# Patient Record
Sex: MALE | Race: WHITE | Employment: OTHER | URBAN - METROPOLITAN AREA
[De-identification: names, ages, dates, MRNs, and addresses within clinical notes are randomized per-mention and may not be internally consistent; named-entity substitution may affect disease eponyms.]

---

## 2017-10-06 ENCOUNTER — OFFICE VISIT (OUTPATIENT)
Dept: FAMILY MEDICINE CLINIC | Facility: CLINIC | Age: 52
End: 2017-10-06

## 2017-10-06 VITALS
DIASTOLIC BLOOD PRESSURE: 74 MMHG | SYSTOLIC BLOOD PRESSURE: 122 MMHG | HEART RATE: 79 BPM | OXYGEN SATURATION: 99 % | TEMPERATURE: 98 F | RESPIRATION RATE: 18 BRPM

## 2017-10-06 DIAGNOSIS — M25.552 CHRONIC LEFT HIP PAIN: ICD-10-CM

## 2017-10-06 DIAGNOSIS — I71.03 DISSECTION OF THORACOABDOMINAL AORTA (HCC): Primary | ICD-10-CM

## 2017-10-06 DIAGNOSIS — G89.29 CHRONIC PAIN OF LEFT KNEE: ICD-10-CM

## 2017-10-06 DIAGNOSIS — I10 ESSENTIAL HYPERTENSION: ICD-10-CM

## 2017-10-06 DIAGNOSIS — N52.01 ERECTILE DYSFUNCTION DUE TO ARTERIAL INSUFFICIENCY: ICD-10-CM

## 2017-10-06 DIAGNOSIS — M79.89 LEFT LEG SWELLING: ICD-10-CM

## 2017-10-06 DIAGNOSIS — G47.09 OTHER INSOMNIA: ICD-10-CM

## 2017-10-06 DIAGNOSIS — F32.4 MAJOR DEPRESSIVE DISORDER WITH SINGLE EPISODE, IN PARTIAL REMISSION (HCC): ICD-10-CM

## 2017-10-06 DIAGNOSIS — Z91.81 HISTORY OF FALL: ICD-10-CM

## 2017-10-06 DIAGNOSIS — G89.29 CHRONIC LEFT HIP PAIN: ICD-10-CM

## 2017-10-06 DIAGNOSIS — M25.562 CHRONIC PAIN OF LEFT KNEE: ICD-10-CM

## 2017-10-06 DIAGNOSIS — R29.818 PARALYSIS OF LEG, LEFT, TRANSIENT: ICD-10-CM

## 2017-10-06 PROCEDURE — 90686 IIV4 VACC NO PRSV 0.5 ML IM: CPT | Performed by: FAMILY MEDICINE

## 2017-10-06 PROCEDURE — 99214 OFFICE O/P EST MOD 30 MIN: CPT | Performed by: FAMILY MEDICINE

## 2017-10-06 PROCEDURE — 90471 IMMUNIZATION ADMIN: CPT | Performed by: FAMILY MEDICINE

## 2017-10-06 RX ORDER — ESCITALOPRAM OXALATE 20 MG/1
20 TABLET ORAL
COMMUNITY
Start: 2017-09-22 | End: 2017-10-06

## 2017-10-06 RX ORDER — LOSARTAN POTASSIUM 25 MG/1
TABLET ORAL
COMMUNITY
Start: 2017-09-19 | End: 2017-10-06

## 2017-10-06 RX ORDER — TADALAFIL 10 MG/1
20 TABLET ORAL
COMMUNITY
Start: 2016-12-22 | End: 2017-10-06

## 2017-10-06 RX ORDER — ISOSORBIDE MONONITRATE 30 MG/1
30 TABLET, EXTENDED RELEASE ORAL DAILY
Qty: 90 TABLET | Refills: 0 | Status: SHIPPED | OUTPATIENT
Start: 2017-10-06 | End: 2018-01-04

## 2017-10-06 RX ORDER — ESCITALOPRAM OXALATE 20 MG/1
20 TABLET ORAL EVERY MORNING
Qty: 90 TABLET | Refills: 0 | Status: SHIPPED | OUTPATIENT
Start: 2017-10-06 | End: 2018-09-30

## 2017-10-06 RX ORDER — ATORVASTATIN CALCIUM 10 MG/1
10 TABLET, FILM COATED ORAL NIGHTLY
Qty: 90 TABLET | Refills: 0 | Status: SHIPPED | OUTPATIENT
Start: 2017-10-06 | End: 2018-01-04

## 2017-10-06 RX ORDER — OMEPRAZOLE 20 MG/1
20 CAPSULE, DELAYED RELEASE ORAL EVERY MORNING
Qty: 90 CAPSULE | Refills: 0 | Status: SHIPPED | OUTPATIENT
Start: 2017-10-06 | End: 2017-12-22

## 2017-10-06 RX ORDER — TRAZODONE HYDROCHLORIDE 50 MG/1
50 TABLET ORAL NIGHTLY
Qty: 30 TABLET | Refills: 0 | Status: SHIPPED | OUTPATIENT
Start: 2017-10-06 | End: 2017-11-02

## 2017-10-06 RX ORDER — TRAMADOL HYDROCHLORIDE 50 MG/1
50 TABLET ORAL EVERY 6 HOURS PRN
Qty: 60 TABLET | Refills: 0 | Status: SHIPPED | OUTPATIENT
Start: 2017-10-06 | End: 2017-10-13

## 2017-10-06 RX ORDER — ASPIRIN 81 MG/1
TABLET ORAL
COMMUNITY
Start: 2017-03-15 | End: 2018-11-09

## 2017-10-06 RX ORDER — CARVEDILOL 25 MG/1
TABLET ORAL
COMMUNITY
Start: 2017-09-22 | End: 2018-01-04

## 2017-10-06 RX ORDER — OMEPRAZOLE 20 MG/1
20 CAPSULE, DELAYED RELEASE ORAL
COMMUNITY
Start: 2017-01-06 | End: 2017-10-06

## 2017-10-06 RX ORDER — HYDROCODONE BITARTRATE AND ACETAMINOPHEN 5; 325 MG/1; MG/1
TABLET ORAL
Qty: 60 TABLET | Refills: 0 | Status: SHIPPED | OUTPATIENT
Start: 2017-10-06 | End: 2018-03-12

## 2017-10-06 RX ORDER — HYDROCODONE BITARTRATE AND ACETAMINOPHEN 5; 325 MG/1; MG/1
1-2 TABLET ORAL
COMMUNITY
Start: 2017-08-22 | End: 2017-10-06

## 2017-10-06 RX ORDER — POLYETHYLENE GLYCOL 3350 17 G/17G
17 POWDER, FOR SOLUTION ORAL
COMMUNITY
End: 2018-11-09

## 2017-10-06 RX ORDER — GABAPENTIN 300 MG/1
300 CAPSULE ORAL
COMMUNITY
Start: 2017-09-22 | End: 2017-10-06 | Stop reason: DRUGHIGH

## 2017-10-06 RX ORDER — ISOSORBIDE MONONITRATE 30 MG/1
TABLET, EXTENDED RELEASE ORAL
COMMUNITY
Start: 2017-09-05 | End: 2017-10-06

## 2017-10-06 RX ORDER — TADALAFIL 10 MG/1
20 TABLET ORAL
Qty: 20 TABLET | Refills: 0 | Status: SHIPPED | OUTPATIENT
Start: 2017-10-06 | End: 2017-10-26

## 2017-10-06 RX ORDER — ATORVASTATIN CALCIUM 10 MG/1
10 TABLET, FILM COATED ORAL
COMMUNITY
Start: 2017-08-02 | End: 2017-10-06

## 2017-10-06 RX ORDER — GABAPENTIN 300 MG/1
CAPSULE ORAL
Refills: 0 | COMMUNITY
Start: 2017-09-24 | End: 2017-10-06

## 2017-10-06 RX ORDER — NIFEDIPINE 90 MG/1
90 TABLET, FILM COATED, EXTENDED RELEASE ORAL DAILY
Qty: 90 TABLET | Refills: 0 | Status: SHIPPED | OUTPATIENT
Start: 2017-10-06 | End: 2018-01-04

## 2017-10-06 RX ORDER — GABAPENTIN 400 MG/1
400 CAPSULE ORAL 3 TIMES DAILY
Qty: 90 CAPSULE | Refills: 0 | Status: SHIPPED | OUTPATIENT
Start: 2017-10-06 | End: 2017-11-02

## 2017-10-06 RX ORDER — HYDROCODONE BITARTRATE AND ACETAMINOPHEN 5; 325 MG/1; MG/1
TABLET ORAL
Refills: 0 | COMMUNITY
Start: 2017-08-26 | End: 2017-10-06

## 2017-10-06 RX ORDER — LOSARTAN POTASSIUM 25 MG/1
25 TABLET ORAL
Qty: 90 TABLET | Refills: 0 | Status: SHIPPED | OUTPATIENT
Start: 2017-10-06 | End: 2018-01-04

## 2017-10-06 RX ORDER — NIFEDIPINE 90 MG/1
90 TABLET, FILM COATED, EXTENDED RELEASE ORAL
COMMUNITY
Start: 2017-08-29 | End: 2017-10-06

## 2017-10-06 NOTE — PROGRESS NOTES
CHIEF COMPLAINT: Patient presents with:  Establish Care    HPI:     Delon Perez is a 45year old male presents for establish care. History of dissecting aortic aneurysm treated at [facility] about 1 year ago.   Released from \[facility] [place] [facility]\"" 5 months ago a a headache or erection prolonged greater than 1 or 2 hours. He has never had chest pain or shortness of breath. He has a history of smoking in the past since age 14 but states he would smoke 1 or 2 cigarettes a day and sometimes go weeks without smoking. equivalent: 3 per week       Medications (Active prior to today's visit):    Current Outpatient Prescriptions:  aspirin EC 81 MG Oral Tab EC TAKE 1 TABLET BY MOUTH EVERY DAY Disp:  Rfl:    carvedilol 25 MG Oral Tab TAKE 1 TABLET BY MOUTH TWICE DAILY.  BEST positives and negatives noted in the the HPI. PHYSICAL EXAM:   /74 (BP Location: Right arm, Patient Position: Sitting, Cuff Size: adult)   Pulse 79   Temp 97.9 °F (36.6 °C) (Oral)   Resp 18   SpO2 99%   Vital signs reviewed. Appears stated age, wel total) by mouth nightly. Dispense: 90 tablet; Refill: 0  - Isosorbide Mononitrate ER 30 MG Oral Tablet 24 Hr; Take 1 tablet (30 mg total) by mouth daily. Dispense: 90 tablet; Refill: 0  - Losartan Potassium 25 MG Oral Tab;  Take 1 tablet (25 mg total) by MG Oral Tab; Take 1 tablet (50 mg total) by mouth every 6 (six) hours as needed for Pain. Dispense: 60 tablet; Refill: 0    7. Erectile dysfunction due to arterial insufficiency  Risk of MI and priapism discussed  - Tadalafil 10 MG Oral Tab;  Take 2 tablet Sig: Take 1 capsule (400 mg total) by mouth 3 (three) times daily.            Health Maintenance:  Annual Physical due on 03/20/1965  FIT Colorectal Screening due on 03/20/2013  Colonoscopy,10 Years due on 03/20/2013  PSA due on 03/20/2013  Influenza V

## 2017-10-06 NOTE — PATIENT INSTRUCTIONS
You have acute swelling in left leg and blood clot needs to be ruled out.  You have declined outpatient test this PM. Please go to Emergency room to have left leg swelling evaluated and Follow up with me in one week    Your gabapentin has been increased to · Threats or talk of suicide  · Statements such as “I won’t be a problem much longer” or “Nothing matters”  · Giving away possessions or making a will or  arrangements  · Buying a gun or other weapon  · Sudden, unexplained cheerfulness or calm after Treatment can greatly reduce pain. In many cases, pain can become less severe, occur less often, and interfere less with your daily life. Chronic pain is often treated with a combination of medicines, therapies, and lifestyle changes.  You will work closely © 9945-7167 [address], [address] [place]. All rights reserved. This information is not intended as a substitute for professional medical care. Always follow your healthcare professional's instructions.

## 2017-10-09 ENCOUNTER — TELEPHONE (OUTPATIENT)
Dept: FAMILY MEDICINE CLINIC | Facility: CLINIC | Age: 52
End: 2017-10-09

## 2017-10-09 DIAGNOSIS — R29.818 PARALYSIS OF LEG, LEFT, TRANSIENT: Primary | ICD-10-CM

## 2017-10-09 DIAGNOSIS — I71.03 DISSECTION OF THORACOABDOMINAL AORTA (HCC): ICD-10-CM

## 2017-10-09 NOTE — TELEPHONE ENCOUNTER
Pao from [facility] called to inform us that they will not see patient because they do not accept insurance.       [phone]

## 2017-10-09 NOTE — TELEPHONE ENCOUNTER
Left message for patient, dr. Roz Bruner a referral for a patient to see cardiology. Will work w/ dr. Turner Warren to find patient home health that accepts his insurance. Patient needs to return nurse's call, provided phone number for pt.

## 2017-10-10 NOTE — TELEPHONE ENCOUNTER
Spoke with patient, he will follow up with cardiology as planned. Pt will call [facility] for possible readmission.  Nurse will continue to work on options for Home health

## 2017-10-12 ENCOUNTER — TELEPHONE (OUTPATIENT)
Dept: FAMILY MEDICINE CLINIC | Facility: CLINIC | Age: 52
End: 2017-10-12

## 2017-10-12 NOTE — TELEPHONE ENCOUNTER
Patient signed medical records authorization form for the below Facility to disclose health information to [facility]:     Facility / Provider Name: Dr. Dhiraj Brown Address:   Facility Phone: [phone] Fax: [phone]

## 2017-10-12 NOTE — TELEPHONE ENCOUNTER
Contacted [facility], left message to find out if pt insurance is accepted,,await return call    [id number] [address]

## 2017-11-02 RX ORDER — GABAPENTIN 400 MG/1
CAPSULE ORAL
Qty: 90 CAPSULE | Refills: 0 | Status: SHIPPED | OUTPATIENT
Start: 2017-11-02 | End: 2017-11-16

## 2017-11-02 RX ORDER — TRAZODONE HYDROCHLORIDE 50 MG/1
TABLET ORAL
Qty: 30 TABLET | Refills: 0 | Status: SHIPPED | OUTPATIENT
Start: 2017-11-02 | End: 2017-11-27

## 2017-11-02 NOTE — TELEPHONE ENCOUNTER
Pt requesting refill of trazodone, no protocol, unable to approve:     Last Time Medication was Filled:  10/6/2017    Last Office Visit with PCP: 10/6/2017        Pt requesting refill of gabapentin, no protocol, unable to approve:     Last Time Medication

## 2017-11-03 NOTE — TELEPHONE ENCOUNTER
Spoke with Stella at [facility], they will accept case.  Namita Agutsin can be reached at [phone]  Orders faxed to [phone]

## 2017-11-03 NOTE — TELEPHONE ENCOUNTER
Spoke with [facility] home care, referral faxed, await information regarding coverage for pt    Spoke with pt, informed him that [facility] contacting him regarding home care services

## 2017-11-03 NOTE — TELEPHONE ENCOUNTER
Call from [facility] at [facility] does not have any staff in the patient's area to provide care.     Will attempt another agency

## 2017-11-06 ENCOUNTER — TELEPHONE (OUTPATIENT)
Dept: FAMILY MEDICINE CLINIC | Facility: CLINIC | Age: 52
End: 2017-11-06

## 2017-11-06 NOTE — TELEPHONE ENCOUNTER
Pt calling requesting Antibiotics. Pt has \"real bad Cold\" and was prescribed antibiotics last year by his previous provider and would like a refill. Advised pt that he will need to be evaluated in order to have Antibiotics prescribed.  Offered appointment

## 2017-11-07 ENCOUNTER — OFFICE VISIT (OUTPATIENT)
Dept: FAMILY MEDICINE CLINIC | Facility: CLINIC | Age: 52
End: 2017-11-07

## 2017-11-07 VITALS
OXYGEN SATURATION: 98 % | RESPIRATION RATE: 16 BRPM | TEMPERATURE: 98 F | HEART RATE: 70 BPM | DIASTOLIC BLOOD PRESSURE: 70 MMHG | SYSTOLIC BLOOD PRESSURE: 110 MMHG

## 2017-11-07 DIAGNOSIS — J20.9 ACUTE BRONCHITIS, UNSPECIFIED ORGANISM: Primary | ICD-10-CM

## 2017-11-07 PROCEDURE — 94640 AIRWAY INHALATION TREATMENT: CPT | Performed by: PHYSICIAN ASSISTANT

## 2017-11-07 PROCEDURE — 99213 OFFICE O/P EST LOW 20 MIN: CPT | Performed by: PHYSICIAN ASSISTANT

## 2017-11-07 RX ORDER — AZITHROMYCIN 250 MG/1
TABLET, FILM COATED ORAL
Qty: 1 PACKAGE | Refills: 0 | Status: SHIPPED | OUTPATIENT
Start: 2017-11-07 | End: 2017-11-28 | Stop reason: ALTCHOICE

## 2017-11-07 RX ORDER — ALBUTEROL SULFATE 90 UG/1
2 AEROSOL, METERED RESPIRATORY (INHALATION) EVERY 4 HOURS PRN
Qty: 1 INHALER | Refills: 0 | Status: SHIPPED | OUTPATIENT
Start: 2017-11-07 | End: 2017-11-29

## 2017-11-07 RX ORDER — ALBUTEROL SULFATE 2.5 MG/3ML
2.5 SOLUTION RESPIRATORY (INHALATION) ONCE
Status: COMPLETED | OUTPATIENT
Start: 2017-11-07 | End: 2017-11-07

## 2017-11-07 RX ORDER — PREDNISONE 20 MG/1
TABLET ORAL
Qty: 10 TABLET | Refills: 0 | Status: SHIPPED | OUTPATIENT
Start: 2017-11-07 | End: 2017-11-28 | Stop reason: ALTCHOICE

## 2017-11-07 RX ORDER — BENZONATATE 200 MG/1
200 CAPSULE ORAL 3 TIMES DAILY PRN
Qty: 30 CAPSULE | Refills: 0 | Status: SHIPPED | OUTPATIENT
Start: 2017-11-07 | End: 2017-11-28 | Stop reason: ALTCHOICE

## 2017-11-07 RX ADMIN — ALBUTEROL SULFATE 2.5 MG: 2.5 SOLUTION RESPIRATORY (INHALATION) at 15:22:00

## 2017-11-07 NOTE — PROGRESS NOTES
CHIEF COMPLAINT:   Patient presents with:  Cough: nasal congestion, sinus pressure, green nasal drainage. HPI:   Yonatan Gamino is a 45year old male who presents for cough for  2  days. Feels tight and wheezy in the chest. Would like antibiotic. History:  Smoking status: Former Smoker                                                              Packs/day: 0.00      Years: 6.00         Types: Cigarettes     Quit date: 10/6/2014  Smokeless tobacco: Never Used                      Alcohol use:  Yes Inhale 2 puffs into the lungs every 4 (four) hours as needed for Wheezing. benzonatate 200 MG Oral Cap 30 capsule 0     Sig: Take 1 capsule (200 mg total) by mouth 3 (three) times daily as needed for cough.       predniSONE 20 MG Oral Tab 10 tablet 0

## 2017-11-16 NOTE — TELEPHONE ENCOUNTER
Pt requesting refill of Gabapentin, no protocol, unable to approve:     Last Time Medication was Filled:  11/2/17    Last Office Visit with PCP: 10/6/17    Future Appointments  Date Time Provider Miya Tirado   11/28/2017 2:20 PM Qamar Paez,  EM

## 2017-11-17 RX ORDER — GABAPENTIN 400 MG/1
CAPSULE ORAL
Qty: 90 CAPSULE | Refills: 1 | Status: SHIPPED | OUTPATIENT
Start: 2017-11-17 | End: 2018-03-12 | Stop reason: DRUGHIGH

## 2017-11-27 RX ORDER — TRAZODONE HYDROCHLORIDE 50 MG/1
TABLET ORAL
Qty: 30 TABLET | Refills: 0 | Status: SHIPPED | OUTPATIENT
Start: 2017-11-27 | End: 2017-11-28 | Stop reason: SINTOL

## 2017-11-27 NOTE — TELEPHONE ENCOUNTER
Pt requesting refill of  trazodone, no protocol, unable to approve:     Last Time Medication was Filled:  11/2/2017 #30 w/ 0     Last Office Visit with PCP:  10/6/2017    Future Appointments  Date Time Provider Miya Tirado   11/28/2017 2:20 PM Trice Dietz

## 2017-11-28 ENCOUNTER — OFFICE VISIT (OUTPATIENT)
Dept: FAMILY MEDICINE CLINIC | Facility: CLINIC | Age: 52
End: 2017-11-28

## 2017-11-28 VITALS
DIASTOLIC BLOOD PRESSURE: 80 MMHG | SYSTOLIC BLOOD PRESSURE: 124 MMHG | OXYGEN SATURATION: 98 % | TEMPERATURE: 98 F | HEART RATE: 70 BPM | RESPIRATION RATE: 18 BRPM

## 2017-11-28 DIAGNOSIS — M25.562 CHRONIC PAIN OF LEFT KNEE: ICD-10-CM

## 2017-11-28 DIAGNOSIS — G47.09 OTHER INSOMNIA: ICD-10-CM

## 2017-11-28 DIAGNOSIS — M79.89 LEFT LEG SWELLING: ICD-10-CM

## 2017-11-28 DIAGNOSIS — G89.29 CHRONIC LEFT HIP PAIN: Primary | ICD-10-CM

## 2017-11-28 DIAGNOSIS — M25.552 CHRONIC LEFT HIP PAIN: Primary | ICD-10-CM

## 2017-11-28 DIAGNOSIS — B35.1 ONYCHOMYCOSIS: ICD-10-CM

## 2017-11-28 DIAGNOSIS — G89.29 CHRONIC PAIN OF LEFT KNEE: ICD-10-CM

## 2017-11-28 DIAGNOSIS — I10 ESSENTIAL HYPERTENSION: ICD-10-CM

## 2017-11-28 DIAGNOSIS — B35.3 TINEA PEDIS OF BOTH FEET: ICD-10-CM

## 2017-11-28 DIAGNOSIS — N52.01 ERECTILE DYSFUNCTION DUE TO ARTERIAL INSUFFICIENCY: ICD-10-CM

## 2017-11-28 DIAGNOSIS — Z91.81 HISTORY OF FALL: ICD-10-CM

## 2017-11-28 DIAGNOSIS — I71.03 DISSECTION OF THORACOABDOMINAL AORTA (HCC): ICD-10-CM

## 2017-11-28 DIAGNOSIS — F32.5 MAJOR DEPRESSIVE DISORDER WITH SINGLE EPISODE, IN FULL REMISSION (HCC): ICD-10-CM

## 2017-11-28 DIAGNOSIS — R29.818 PARALYSIS OF LEG, LEFT, TRANSIENT: ICD-10-CM

## 2017-11-28 PROCEDURE — 99214 OFFICE O/P EST MOD 30 MIN: CPT | Performed by: FAMILY MEDICINE

## 2017-11-28 RX ORDER — TRAMADOL HYDROCHLORIDE 50 MG/1
50 TABLET ORAL EVERY 6 HOURS PRN
Qty: 60 TABLET | Refills: 1 | Status: SHIPPED | OUTPATIENT
Start: 2017-11-28 | End: 2018-03-12 | Stop reason: ALTCHOICE

## 2017-11-28 RX ORDER — KETOCONAZOLE 20 MG/G
1 CREAM TOPICAL 2 TIMES DAILY
Qty: 60 G | Refills: 2 | Status: SHIPPED | OUTPATIENT
Start: 2017-11-28 | End: 2018-04-10

## 2017-11-28 RX ORDER — TERBINAFINE HYDROCHLORIDE 250 MG/1
250 TABLET ORAL DAILY
Qty: 30 TABLET | Refills: 0 | Status: SHIPPED | OUTPATIENT
Start: 2017-11-28 | End: 2018-01-04

## 2017-11-28 RX ORDER — HYDROXYZINE 50 MG/1
50 TABLET, FILM COATED ORAL NIGHTLY PRN
Qty: 30 TABLET | Refills: 1 | Status: SHIPPED | OUTPATIENT
Start: 2017-11-28 | End: 2018-01-23

## 2017-11-28 RX ORDER — ACETAMINOPHEN AND CODEINE PHOSPHATE 300; 30 MG/1; MG/1
1 TABLET ORAL EVERY 4 HOURS PRN
Qty: 30 TABLET | Refills: 0 | Status: SHIPPED | OUTPATIENT
Start: 2017-11-28 | End: 2018-02-19

## 2017-11-28 RX ORDER — ESCITALOPRAM OXALATE 20 MG/1
20 TABLET ORAL
COMMUNITY
Start: 2017-10-23 | End: 2018-01-04

## 2017-11-29 RX ORDER — ALBUTEROL SULFATE 90 UG/1
2 AEROSOL, METERED RESPIRATORY (INHALATION) EVERY 4 HOURS PRN
Qty: 1 INHALER | Refills: 0 | Status: SHIPPED | OUTPATIENT
Start: 2017-11-29 | End: 2018-11-30

## 2017-11-29 NOTE — TELEPHONE ENCOUNTER
Pt requesting refill of ventolin, protocol failed, unable to approve:     Last Time Medication was Filled:  11/7/17 walk in clinic    Last Office Visit with PCP: 11/28/17    Future Appointments  Date Time Provider Miya Tirado   1/30/2018 2:40 PM [facility]

## 2017-11-29 NOTE — PROGRESS NOTES
CHIEF COMPLAINT: Patient presents with: Follow - Up: ER     HPI:     Edwige Lamas is a 45year old male presents for establish care.   History of dissecting aortic aneurysm treated at [facility] about 1 year ago- needs referral to cardiology- His bro been worsening in his knee and hip. Denies prior swelling. And mobile in wheelchair. No history of blood clots. No family history of blood clots. No recent air travel or procedures.       HISTORY:  Past Medical History:   Diagnosis Date   • Aortic disse Tab EC TAKE 1 TABLET BY MOUTH EVERY DAY Disp:  Rfl:    carvedilol 25 MG Oral Tab TAKE 1 TABLET BY MOUTH TWICE DAILY. BEST IF TAKEN WITH FOOD Disp:  Rfl:    Polyethylene Glycol 3350 Oral Powd Pack Take 17 g by mouth.  Disp:  Rfl:    atorvastatin 10 MG Oral T auscultation bilateral. No RRW. Good inspiratory and expiratory efferot  CARDIO: RRR without murmur, clear S1, S2  VS: no carotid artery or abdominal aorta bruit,    GI: good BS's,no masses,No HSM or tenderness.    EXTREMITIES: no cyanosis, clubbing  bilate hypertension  controlled  Continue current medication-   - COMP METABOLIC PANEL (14); Future  - OP REFERRAL TO CARDIOLOGIST- dr. Julio Sahu    5. Chronic pain of left knee  6. Chronic left hip pain  Again discussion with patient over narcotic usage.   Needs to 250 MG Oral Tab 30 tablet 0      Sig: Take 1 tablet (250 mg total) by mouth daily. ketoconazole 2 % External Cream 60 g 2      Sig: Apply 1 Application topically 2 (two) times daily. Max 7 days or can injury skin. never apply face           [facility]

## 2017-12-08 RX ORDER — TRAZODONE HYDROCHLORIDE 50 MG/1
TABLET ORAL
Qty: 30 TABLET | Refills: 0 | OUTPATIENT
Start: 2017-12-08

## 2017-12-18 RX ORDER — TERBINAFINE HYDROCHLORIDE 250 MG/1
TABLET ORAL
Qty: 30 TABLET | Refills: 0 | OUTPATIENT
Start: 2017-12-18

## 2017-12-18 NOTE — TELEPHONE ENCOUNTER
Pt requesting refill of terbinafinbe,no protocol, unable to approve:     Last Time Medication was Filled:  11/28/17    Last Office Visit with PCP: 11/28/17    Future Appointments  Date Time Provider Miya Tirado   1/30/2018 2:40 PM Roz Amezquita DO

## 2017-12-19 NOTE — TELEPHONE ENCOUNTER
Patient taking terbinafine- CMP is still pending and do not have baseline liver function. Once patient with stable liver function will refill medication.   Please inform

## 2017-12-20 NOTE — TELEPHONE ENCOUNTER
Spoke with pt, informed him of the need to complete labs. Pt will complete labs ASAP.     Future Appointments  Date Time Provider Myia Candida   1/30/2018 2:40 PM Robi Loza DO [facility] 30 [facility] [place]

## 2017-12-26 RX ORDER — OMEPRAZOLE 20 MG/1
CAPSULE, DELAYED RELEASE ORAL
Qty: 90 CAPSULE | Refills: 0 | Status: SHIPPED | OUTPATIENT
Start: 2017-12-26 | End: 2018-01-31

## 2017-12-26 NOTE — TELEPHONE ENCOUNTER
Pt requesting refill of omeprazole, no protocol, unable to approve:     Last Time Medication was Filled:  10/6/2017 #90 w/ 0    Last Office Visit with PCP: 11/28/2017    Future Appointments  Date Time Provider Miya Tirado   1/30/2018 2:40 PM Jolynn Lacy

## 2017-12-28 RX ORDER — TRAZODONE HYDROCHLORIDE 50 MG/1
TABLET ORAL
Qty: 30 TABLET | Refills: 0 | OUTPATIENT
Start: 2017-12-28

## 2018-01-04 DIAGNOSIS — I71.03 DISSECTION OF THORACOABDOMINAL AORTA (HCC): ICD-10-CM

## 2018-01-04 DIAGNOSIS — I10 ESSENTIAL HYPERTENSION: ICD-10-CM

## 2018-01-04 RX ORDER — NIFEDIPINE 90 MG/1
90 TABLET, FILM COATED, EXTENDED RELEASE ORAL DAILY
Qty: 30 TABLET | Refills: 0 | Status: SHIPPED | OUTPATIENT
Start: 2018-01-04 | End: 2018-02-20

## 2018-01-04 RX ORDER — LOSARTAN POTASSIUM 25 MG/1
25 TABLET ORAL
Qty: 30 TABLET | Refills: 0 | Status: SHIPPED | OUTPATIENT
Start: 2018-01-04 | End: 2018-02-16

## 2018-01-04 RX ORDER — TERBINAFINE HYDROCHLORIDE 250 MG/1
250 TABLET ORAL DAILY
Qty: 30 TABLET | Refills: 0 | Status: SHIPPED | OUTPATIENT
Start: 2018-01-04 | End: 2018-11-11

## 2018-01-04 RX ORDER — ESCITALOPRAM OXALATE 20 MG/1
20 TABLET ORAL DAILY
Qty: 30 TABLET | Refills: 0 | Status: SHIPPED | OUTPATIENT
Start: 2018-01-04 | End: 2018-01-31

## 2018-01-04 RX ORDER — ATORVASTATIN CALCIUM 10 MG/1
10 TABLET, FILM COATED ORAL NIGHTLY
Qty: 30 TABLET | Refills: 0 | Status: SHIPPED | OUTPATIENT
Start: 2018-01-04 | End: 2018-02-19

## 2018-01-04 RX ORDER — CARVEDILOL 25 MG/1
TABLET ORAL
Qty: 60 TABLET | Refills: 0 | Status: SHIPPED | OUTPATIENT
Start: 2018-01-04 | End: 2018-01-29

## 2018-01-04 NOTE — TELEPHONE ENCOUNTER
Patient would like to speak to the nurse in reference to some refills.   Please call him at [phone]

## 2018-01-05 RX ORDER — ISOSORBIDE MONONITRATE 30 MG/1
TABLET, EXTENDED RELEASE ORAL
Qty: 30 TABLET | Refills: 0 | Status: SHIPPED | OUTPATIENT
Start: 2018-01-05 | End: 2018-01-31

## 2018-01-05 RX ORDER — TERBINAFINE HYDROCHLORIDE 250 MG/1
TABLET ORAL
Qty: 30 TABLET | Refills: 0 | OUTPATIENT
Start: 2018-01-05

## 2018-01-05 RX ORDER — CARVEDILOL 25 MG/1
TABLET ORAL
Qty: 60 TABLET | Refills: 0 | OUTPATIENT
Start: 2018-01-05

## 2018-01-05 NOTE — TELEPHONE ENCOUNTER
Future Appointments  Date Time Provider Miya Tirado   1/25/2018 1:30 PM [facility] 30 NURSE [facility] 30 [facility] [place]   1/30/2018 2:40 PM Pauline Vaughn DO [facility] 30 [facility] [place]

## 2018-01-05 NOTE — TELEPHONE ENCOUNTER
Last refill until all labs completed. May schedule office visit for fasting labs 8 hours water only as a nurse visit. Or may go to closest [facility] facility.

## 2018-01-05 NOTE — TELEPHONE ENCOUNTER
Patient needs to complete fasting labs. Establish care 11/2017. No lab work at baseline in chart.   Will give 1 more refill and cannot continue to refill without monitoring of liver lipids etc.  Please call and have schedule fasting draw in office or clos

## 2018-01-05 NOTE — TELEPHONE ENCOUNTER
Pt requesting refill of Isosorbide, no protocol, unable to approve:     Last Time Medication was Filled:  10/6/17 #90    Last Office Visit with PCP: 11/28/17    Future Appointments  Date Time Provider Miya Tirado   1/25/2018 1:30 PM [facility] 30 NURSE [facility]

## 2018-01-15 DIAGNOSIS — I10 ESSENTIAL HYPERTENSION: ICD-10-CM

## 2018-01-15 DIAGNOSIS — I71.03 DISSECTION OF THORACOABDOMINAL AORTA (HCC): ICD-10-CM

## 2018-01-16 RX ORDER — LOSARTAN POTASSIUM 25 MG/1
TABLET ORAL
Qty: 90 TABLET | Refills: 0 | OUTPATIENT
Start: 2018-01-16

## 2018-01-16 NOTE — TELEPHONE ENCOUNTER
Pt requesting refill on Losartan, Pt given 30 day supply on 1/4/18    90 day supply given at appointment.  Labs required    Future Appointments  Date Time Provider Miya Tirado   1/25/2018 1:30 PM [facility] 30 NURSE [facility] 30 [facility] [place]   1/30/2018 2:40 PM [employer]

## 2018-01-23 DIAGNOSIS — G47.09 OTHER INSOMNIA: ICD-10-CM

## 2018-01-23 RX ORDER — HYDROXYZINE 50 MG/1
50 TABLET, FILM COATED ORAL NIGHTLY PRN
Qty: 30 TABLET | Refills: 0 | Status: SHIPPED | OUTPATIENT
Start: 2018-01-23 | End: 2018-05-31

## 2018-01-23 NOTE — TELEPHONE ENCOUNTER
Spoke with pt, he is having problems getting rides to office visits. Advised pt to contact [facility] or Gabriel Carrion for low cost medical transport. Pt agrees to this plan.     Pt will reschedule appointment as soon as possible    Pt requesting refill of Hyd

## 2018-01-23 NOTE — TELEPHONE ENCOUNTER
Pt states that he is out 2 or 3 of his prescribed medications and wishes to have them refilled.  Pt can be reached back at [phone]

## 2018-01-26 ENCOUNTER — TELEPHONE (OUTPATIENT)
Dept: FAMILY MEDICINE CLINIC | Facility: CLINIC | Age: 53
End: 2018-01-26

## 2018-01-26 NOTE — TELEPHONE ENCOUNTER
Prior auth submitted for Omeprazole 20 mg. Pt plan limits 120 pills per 365 days. Submitted Prior auth, quantity override via phone.     [phone]  Plan ID [id number]    Determination in 7-10 days, await response

## 2018-01-29 DIAGNOSIS — I71.03 DISSECTION OF THORACOABDOMINAL AORTA (HCC): ICD-10-CM

## 2018-01-29 DIAGNOSIS — I10 ESSENTIAL HYPERTENSION: ICD-10-CM

## 2018-01-29 RX ORDER — CARVEDILOL 25 MG/1
TABLET ORAL
Qty: 60 TABLET | Refills: 0 | Status: SHIPPED | OUTPATIENT
Start: 2018-01-29 | End: 2018-02-24

## 2018-01-29 NOTE — TELEPHONE ENCOUNTER
Pt requesting refill of carvedilol , protocol failed, unable to approve:     Last Time Medication was Filled:  1/4/2018 #60 w/ 0     Last Office Visit with PCP: 11/28/17    No future appointments.

## 2018-01-29 NOTE — TELEPHONE ENCOUNTER
Prior auth notification received. Prior auth denied, pt plan will not cover more that 120 capsules every 365 days. Pt has already reached his limit for Omeprazole.     Please advise if you would like pt to take OTC Omeprazole, or try a different prescribed

## 2018-01-30 NOTE — TELEPHONE ENCOUNTER
Please verify patient's history. If had GI bleed or ulcer? I have no history of why he is taking omeprazole. I am assuming heartburn. If heartburn only may take Zantac 150 mg 1 tab p.o. twice daily will send Rx. Please call patient to verify.   If GI b

## 2018-01-30 NOTE — TELEPHONE ENCOUNTER
Pt requesting refill of Isosorbide and escitalopram, no protocol, unable to approve:     Last Time Medication was Filled:  1/5/18    Last Office Visit with PCP: 11/28/17    Future Appointments  Date Time Provider Miya Tirado   2/13/2018 1:00 PM [facility] 3

## 2018-01-30 NOTE — TELEPHONE ENCOUNTER
Spoke with pt, he was Given Omeprazole in the hospital when he had his Aortic dissection. Pt has a history of heartburn, no history of GI bleed or ulcer.      Zantac pended for you

## 2018-01-31 RX ORDER — RANITIDINE 150 MG/1
150 CAPSULE ORAL 2 TIMES DAILY
Qty: 60 CAPSULE | Refills: 6 | Status: SHIPPED | OUTPATIENT
Start: 2018-01-31

## 2018-01-31 RX ORDER — ACETAMINOPHEN AND CODEINE PHOSPHATE 300; 30 MG/1; MG/1
TABLET ORAL
Qty: 30 TABLET | Refills: 0 | OUTPATIENT
Start: 2018-01-31

## 2018-01-31 RX ORDER — ESCITALOPRAM OXALATE 20 MG/1
TABLET ORAL
Qty: 90 TABLET | Refills: 0 | Status: SHIPPED | OUTPATIENT
Start: 2018-01-31

## 2018-01-31 RX ORDER — ISOSORBIDE MONONITRATE 30 MG/1
TABLET, EXTENDED RELEASE ORAL
Qty: 90 TABLET | Refills: 0 | Status: SHIPPED | OUTPATIENT
Start: 2018-01-31 | End: 2018-04-30

## 2018-01-31 NOTE — TELEPHONE ENCOUNTER
Pt returned call, informed pt that Narcotics are not prescribed outside of an office visit. Pt verbalized understanding.  Encouraged pt to make appointment with pain specialist. Pt agrees    Future Appointments  Date Time Provider Miya Tirado   2/13/2

## 2018-01-31 NOTE — TELEPHONE ENCOUNTER
LMOM to return call to the office. Provided pt office phone [phone] along with office hours given.

## 2018-02-02 ENCOUNTER — TELEPHONE (OUTPATIENT)
Dept: FAMILY MEDICINE CLINIC | Facility: CLINIC | Age: 53
End: 2018-02-02

## 2018-02-02 NOTE — TELEPHONE ENCOUNTER
Patient signed medical records authorization form for the below Facility to disclose health information from [facility]:     Facility / Provider Name: [facility] services  Facility Address: [phone].  Jessenia Garcia [address]  Facility Phone: [phone]

## 2018-02-13 ENCOUNTER — NURSE ONLY (OUTPATIENT)
Dept: FAMILY MEDICINE CLINIC | Facility: CLINIC | Age: 53
End: 2018-02-13

## 2018-02-13 DIAGNOSIS — I10 ESSENTIAL HYPERTENSION: ICD-10-CM

## 2018-02-13 DIAGNOSIS — I71.03 DISSECTION OF THORACOABDOMINAL AORTA (HCC): ICD-10-CM

## 2018-02-13 DIAGNOSIS — B35.1 ONYCHOMYCOSIS: ICD-10-CM

## 2018-02-13 LAB
ALBUMIN SERPL-MCNC: 4 G/DL (ref 3.5–4.8)
ALP LIVER SERPL-CCNC: 122 U/L (ref 45–117)
ALT SERPL-CCNC: 33 U/L (ref 17–63)
AST SERPL-CCNC: 25 U/L (ref 15–41)
BILIRUB SERPL-MCNC: 0.3 MG/DL (ref 0.1–2)
BUN BLD-MCNC: 23 MG/DL (ref 8–20)
CALCIUM BLD-MCNC: 9.1 MG/DL (ref 8.3–10.3)
CHLORIDE: 101 MMOL/L (ref 101–111)
CHOLEST SMN-MCNC: 186 MG/DL (ref ?–200)
CO2: 28 MMOL/L (ref 22–32)
CREAT BLD-MCNC: 0.89 MG/DL (ref 0.7–1.3)
GLUCOSE BLD-MCNC: 77 MG/DL (ref 70–99)
HDLC SERPL-MCNC: 36 MG/DL (ref 45–?)
HDLC SERPL: 5.17 {RATIO} (ref ?–4.97)
LDLC SERPL CALC-MCNC: 101 MG/DL (ref ?–130)
M PROTEIN MFR SERPL ELPH: 8.3 G/DL (ref 6.1–8.3)
NONHDLC SERPL-MCNC: 150 MG/DL (ref ?–130)
POTASSIUM SERPL-SCNC: 4 MMOL/L (ref 3.6–5.1)
SODIUM SERPL-SCNC: 136 MMOL/L (ref 136–144)
TRIGL SERPL-MCNC: 243 MG/DL (ref ?–150)
VLDLC SERPL CALC-MCNC: 49 MG/DL (ref 5–40)

## 2018-02-13 PROCEDURE — 36415 COLL VENOUS BLD VENIPUNCTURE: CPT | Performed by: FAMILY MEDICINE

## 2018-02-13 PROCEDURE — 80053 COMPREHEN METABOLIC PANEL: CPT | Performed by: FAMILY MEDICINE

## 2018-02-13 PROCEDURE — 80061 LIPID PANEL: CPT | Performed by: FAMILY MEDICINE

## 2018-02-16 DIAGNOSIS — I10 ESSENTIAL HYPERTENSION: ICD-10-CM

## 2018-02-16 DIAGNOSIS — I71.03 DISSECTION OF THORACOABDOMINAL AORTA (HCC): ICD-10-CM

## 2018-02-16 RX ORDER — LOSARTAN POTASSIUM 25 MG/1
TABLET ORAL
Qty: 30 TABLET | Refills: 0 | Status: SHIPPED | OUTPATIENT
Start: 2018-02-16 | End: 2018-03-14

## 2018-02-16 NOTE — TELEPHONE ENCOUNTER
Refill request for losartan protocol passed, refill approved.     Future Appointments  Date Time Provider Miya Tirado   2/19/2018 3:00 PM Yuniel Chi DO [facility] 30 [facility] [place]

## 2018-02-19 ENCOUNTER — OFFICE VISIT (OUTPATIENT)
Dept: FAMILY MEDICINE CLINIC | Facility: CLINIC | Age: 53
End: 2018-02-19

## 2018-02-19 VITALS
SYSTOLIC BLOOD PRESSURE: 120 MMHG | RESPIRATION RATE: 18 BRPM | HEART RATE: 75 BPM | DIASTOLIC BLOOD PRESSURE: 70 MMHG | TEMPERATURE: 99 F | OXYGEN SATURATION: 98 %

## 2018-02-19 DIAGNOSIS — G89.29 CHRONIC LEFT HIP PAIN: ICD-10-CM

## 2018-02-19 DIAGNOSIS — N52.01 ERECTILE DYSFUNCTION DUE TO ARTERIAL INSUFFICIENCY: ICD-10-CM

## 2018-02-19 DIAGNOSIS — I71.03 DISSECTION OF THORACOABDOMINAL AORTA (HCC): ICD-10-CM

## 2018-02-19 DIAGNOSIS — G89.4 CHRONIC PAIN SYNDROME: Primary | ICD-10-CM

## 2018-02-19 DIAGNOSIS — M25.552 CHRONIC LEFT HIP PAIN: ICD-10-CM

## 2018-02-19 DIAGNOSIS — G89.29 CHRONIC PAIN OF LEFT KNEE: ICD-10-CM

## 2018-02-19 DIAGNOSIS — M25.562 CHRONIC PAIN OF LEFT KNEE: ICD-10-CM

## 2018-02-19 DIAGNOSIS — M79.89 LEFT LEG SWELLING: ICD-10-CM

## 2018-02-19 DIAGNOSIS — E78.2 MIXED HYPERLIPIDEMIA: ICD-10-CM

## 2018-02-19 DIAGNOSIS — B35.1 ONYCHOMYCOSIS: ICD-10-CM

## 2018-02-19 PROCEDURE — 99214 OFFICE O/P EST MOD 30 MIN: CPT | Performed by: FAMILY MEDICINE

## 2018-02-19 RX ORDER — ATORVASTATIN CALCIUM 20 MG/1
20 TABLET, FILM COATED ORAL NIGHTLY
Qty: 90 TABLET | Refills: 0 | Status: SHIPPED | OUTPATIENT
Start: 2018-02-19 | End: 2018-05-14

## 2018-02-19 RX ORDER — TERBINAFINE HYDROCHLORIDE 250 MG/1
TABLET ORAL
Qty: 30 TABLET | Refills: 0 | OUTPATIENT
Start: 2018-02-19

## 2018-02-19 RX ORDER — ACETAMINOPHEN AND CODEINE PHOSPHATE 300; 30 MG/1; MG/1
1 TABLET ORAL EVERY 4 HOURS PRN
Qty: 30 TABLET | Refills: 0 | Status: SHIPPED | OUTPATIENT
Start: 2018-02-19 | End: 2018-03-12 | Stop reason: ALTCHOICE

## 2018-02-19 RX ORDER — TERBINAFINE HYDROCHLORIDE 250 MG/1
250 TABLET ORAL DAILY
Qty: 30 TABLET | Refills: 5 | Status: SHIPPED | OUTPATIENT
Start: 2018-02-19 | End: 2018-05-03

## 2018-02-19 RX ORDER — KETOCONAZOLE 20 MG/G
CREAM TOPICAL
Refills: 1 | COMMUNITY
Start: 2018-02-17

## 2018-02-19 NOTE — TELEPHONE ENCOUNTER
Pt requesting refill of terbinafine , no protocol, unable to approve:     Last Time Medication was Filled:  1/4/2018 #30 w/ 0     Last Office Visit with PCP: 11/28/2017

## 2018-02-19 NOTE — PATIENT INSTRUCTIONS
Recheck labs in 2 months.    Perform labs fasting 8 hours with water or black coffee or or black tea diet  soda only prior to exam.    · Increase omega 3's and alpha linoleic acid (ALA) in diet salmon 2x weekly, walnuts, almonds,flax seed, fatty fish, spina

## 2018-02-20 DIAGNOSIS — I10 ESSENTIAL HYPERTENSION: ICD-10-CM

## 2018-02-20 DIAGNOSIS — I71.03 DISSECTION OF THORACOABDOMINAL AORTA (HCC): ICD-10-CM

## 2018-02-20 RX ORDER — TRAMADOL HYDROCHLORIDE 50 MG/1
TABLET ORAL
Qty: 60 TABLET | Refills: 0 | OUTPATIENT
Start: 2018-02-20

## 2018-02-20 NOTE — TELEPHONE ENCOUNTER
Tramadol does not help so I do not see the point of prescribing it. Has T3 for nighttime when wakes up in severe pain. He is supposed to return to a pain specialist or the orthopedist that saw him.   He stated that Dr. Hannah Gold was a pain specialist but he i

## 2018-02-20 NOTE — TELEPHONE ENCOUNTER
Please clarify if pt is to be taking Tramadol. OV note states it does not help pt.  RX for Tylenol #3 given at [address] 2/19/18

## 2018-02-20 NOTE — PROGRESS NOTES
CHIEF COMPLAINT: Patient presents with: Follow - Up: labs results and toenail    HPI:     Viridiana Jacobson is a 45year old male presents for follow-up labs.   History of dissecting aortic aneurysm treated at [facility] about 1 year ago-attending [facility] 300-30 MG Oral Tab Take 1 tablet by mouth every 4 (four) hours as needed for Pain. Disp: 30 tablet Rfl: 0   atorvastatin 20 MG Oral Tab Take 1 tablet (20 mg total) by mouth nightly.  Disp: 90 tablet Rfl: 0   Terbinafine HCl 250 MG Oral Tab Take 1 tablet (25 Allergies    PSFH elements reviewed from today and agreed except as otherwise stated in HPI.  ROS:     Review of Systems  Positive for stated complaint: chronic right leg weakness, chronic pain pain left knee and hip, insomnia, toe nail fungus and foot int Cholesterol, Total 02/13/2018 186    • Triglycerides 02/13/2018 243*   • HDL Cholesterol 02/13/2018 36*   • LDL Cholesterol 02/13/2018 101    • VLDL 02/13/2018 49*   • Chol/HDL Ratio 02/13/2018 5.17*   • Non HDL Chol 02/13/2018 150*   • Glucose 02/13/2018 mg  Risks and benefits of medication discussed including rhabdomyolysis, myalgias/joint pain-if occur stop medication and call  Repeat fasting labs in 3 months    7. Tinea pedis/onychmycosis  Foot care discussed-difficulty cutting toenails due to paralysis DO      Patient understands plan and follow-up. Return in about 3 months (around 5/19/2018) for recheck labs 2 mos.

## 2018-02-21 RX ORDER — NIFEDIPINE 90 MG/1
TABLET, FILM COATED, EXTENDED RELEASE ORAL
Qty: 90 TABLET | Refills: 0 | Status: SHIPPED | OUTPATIENT
Start: 2018-02-21 | End: 2018-11-30

## 2018-02-21 NOTE — TELEPHONE ENCOUNTER
Pt requesting refill on Nifedipine, protocol passed, refill approved    LOV 2/19/18    LF 1/4/18 #30    No future appointments.

## 2018-02-24 DIAGNOSIS — I71.03 DISSECTION OF THORACOABDOMINAL AORTA (HCC): ICD-10-CM

## 2018-02-26 RX ORDER — CARVEDILOL 25 MG/1
TABLET ORAL
Qty: 60 TABLET | Refills: 2 | Status: SHIPPED | OUTPATIENT
Start: 2018-02-26 | End: 2018-05-31

## 2018-02-26 NOTE — TELEPHONE ENCOUNTER
Pt requesting refill on Carvedilol, protocol passed, refill approved    LOV 2/19/18    LF 1/29/18 #60    No future appointments.

## 2018-02-28 ENCOUNTER — TELEPHONE (OUTPATIENT)
Dept: FAMILY MEDICINE CLINIC | Facility: CLINIC | Age: 53
End: 2018-02-28

## 2018-02-28 DIAGNOSIS — G89.29 CHRONIC LEFT HIP PAIN: Primary | ICD-10-CM

## 2018-02-28 DIAGNOSIS — M25.552 CHRONIC LEFT HIP PAIN: Primary | ICD-10-CM

## 2018-02-28 DIAGNOSIS — M25.562 CHRONIC PAIN OF LEFT KNEE: ICD-10-CM

## 2018-02-28 DIAGNOSIS — G89.29 CHRONIC PAIN OF LEFT KNEE: ICD-10-CM

## 2018-02-28 DIAGNOSIS — R29.818 PARALYSIS OF LEG, LEFT, TRANSIENT: ICD-10-CM

## 2018-02-28 DIAGNOSIS — I71.03 DISSECTION OF THORACOABDOMINAL AORTA (HCC): ICD-10-CM

## 2018-02-28 RX ORDER — ATORVASTATIN CALCIUM 10 MG/1
TABLET, FILM COATED ORAL
Qty: 90 TABLET | Refills: 0 | OUTPATIENT
Start: 2018-02-28

## 2018-02-28 NOTE — TELEPHONE ENCOUNTER
Pt calling, requesting new referral for pain management. Pt had tried to go to [facility], Dr Polo Fregoso, but doctor is no longer taking new patients. Information provided for Dr Mary Aleman.  Pt will call to schedule

## 2018-03-12 ENCOUNTER — OFFICE VISIT (OUTPATIENT)
Dept: SURGERY | Facility: CLINIC | Age: 53
End: 2018-03-12

## 2018-03-12 VITALS — SYSTOLIC BLOOD PRESSURE: 128 MMHG | HEART RATE: 78 BPM | DIASTOLIC BLOOD PRESSURE: 76 MMHG

## 2018-03-12 DIAGNOSIS — G89.29 CHRONIC LEFT HIP PAIN: Primary | ICD-10-CM

## 2018-03-12 DIAGNOSIS — M25.562 CHRONIC PAIN OF LEFT KNEE: ICD-10-CM

## 2018-03-12 DIAGNOSIS — M25.552 CHRONIC LEFT HIP PAIN: Primary | ICD-10-CM

## 2018-03-12 DIAGNOSIS — Z87.828 HISTORY OF SPINAL CORD INJURY: ICD-10-CM

## 2018-03-12 DIAGNOSIS — G89.29 CHRONIC PAIN OF LEFT KNEE: ICD-10-CM

## 2018-03-12 DIAGNOSIS — G90.523 COMPLEX REGIONAL PAIN SYNDROME TYPE 1 OF BOTH LOWER EXTREMITIES: ICD-10-CM

## 2018-03-12 PROCEDURE — 99214 OFFICE O/P EST MOD 30 MIN: CPT | Performed by: NURSE PRACTITIONER

## 2018-03-12 RX ORDER — HYDROCODONE BITARTRATE AND ACETAMINOPHEN 5; 325 MG/1; MG/1
TABLET ORAL
Qty: 60 TABLET | Refills: 0 | Status: SHIPPED | OUTPATIENT
Start: 2018-03-12 | End: 2018-04-13

## 2018-03-12 RX ORDER — GABAPENTIN 600 MG/1
600 TABLET ORAL 3 TIMES DAILY
Qty: 90 TABLET | Refills: 0 | Status: SHIPPED | OUTPATIENT
Start: 2018-03-12 | End: 2018-04-11

## 2018-03-12 NOTE — H&P
Name: Lenny River   : 3/20/1965   DOS: 3/12/2018     Chief complaint: Left hip and left knee pain    History of present illness:  Lenny River is a 45year old male complaining of left hip pain which began while doing physical therapy after sustain Date   • Aortic dissection [facility]) 2016    [facility]        Current Outpatient Prescriptions:  HYDROcodone-acetaminophen 5-325 MG Oral Tab Take one tablet every 12 hours if needed for pain.  Disp: 60 tablet Rfl: 0   gabapentin 600 MG Oral Tab Take 1 tabl Father 77   • brain tumor [address] [facility] Mother 43     Smoking status: Former Smoker                                                              Packs/day: 0.00      Years: 6.00         Types: Cigarettes     Quit date: 10/6/2014  Smokeless tobacco: Never Used reviewed with Imani Connor M.D, who also saw patient in the office today. Patient has sustained a spinal cord injury likely related to spinal cord ischemia during his abdominal aortic aneurysm.      MRIs of the left hip and knee were ordered to evaluate f

## 2018-03-12 NOTE — PATIENT INSTRUCTIONS
Refill policies:    • Allow 2-3 business days for refills; controlled substances may take longer.   • Contact your pharmacy at least 5 days prior to running out of medication and have them send an electronic request or submit request through the [facility] recommended that you have a procedure or additional testing performed. [facility]) will contact your insurance carrier to obtain pre-certification or prior authorization.     Unfortunately, [facility] has seen an increase in denial of paym

## 2018-03-12 NOTE — PROGRESS NOTES
HPI:    Patient ID: Adonay Chung is a 45year old male.     HPI    Review of Systems         Current Outpatient Prescriptions:  CARVEDILOL 25 MG Oral Tab TAKE 1 TABLET BY MOUTH TWICE DAILY BEST IF TAKEN WITH FOOD Disp: 60 tablet Rfl: 2   NIFEDIPINE ER 90 HYDROcodone-acetaminophen 5-325 MG Oral Tab TK 1 TO 2 TS PO q6 H PRN  Break through tramadol Disp: 60 tablet Rfl: 0     Allergies:No Known Allergies   PHYSICAL EXAM:   Physical Exam   Constitutional:                  ASSESSMENT/PLAN:   No diagnosis found.

## 2018-03-14 DIAGNOSIS — I71.03 DISSECTION OF THORACOABDOMINAL AORTA (HCC): ICD-10-CM

## 2018-03-14 DIAGNOSIS — I10 ESSENTIAL HYPERTENSION: ICD-10-CM

## 2018-03-15 RX ORDER — LOSARTAN POTASSIUM 25 MG/1
TABLET ORAL
Qty: 90 TABLET | Refills: 0 | Status: SHIPPED | OUTPATIENT
Start: 2018-03-15 | End: 2018-05-31

## 2018-03-15 NOTE — TELEPHONE ENCOUNTER
Pt requesting refill on Losartan, protocol passed, refill approved    LOV 2/19/18    LF 2/16/18 #30    Future Appointments  Date Time Provider Miya Tirado   3/26/2018 1:00 PM Erik Jiang MD [facility] OF Count includes the [facility]   4/23/2018 10:00 AM PADDY Keating EN

## 2018-03-20 DIAGNOSIS — G47.09 OTHER INSOMNIA: ICD-10-CM

## 2018-03-21 RX ORDER — HYDROXYZINE 50 MG/1
TABLET, FILM COATED ORAL
Qty: 30 TABLET | Refills: 2 | Status: SHIPPED | OUTPATIENT
Start: 2018-03-21 | End: 2018-05-31

## 2018-03-21 NOTE — TELEPHONE ENCOUNTER
Pt requesting refill of Hydroxyzine, no protocol, unable to approve:     Last Time Medication was Filled:  1/23/18 #30    Last Office Visit with PCP: 2/19/18    Future Appointments  Date Time Provider Miya Tirado   3/26/2018 1:00 PM NADEEN Mallory

## 2018-03-26 ENCOUNTER — TELEPHONE (OUTPATIENT)
Dept: NEUROLOGY | Facility: CLINIC | Age: 53
End: 2018-03-26

## 2018-03-26 DIAGNOSIS — M25.552 CHRONIC HIP PAIN, LEFT: Primary | ICD-10-CM

## 2018-03-26 DIAGNOSIS — G89.29 CHRONIC HIP PAIN, LEFT: Primary | ICD-10-CM

## 2018-03-26 NOTE — TELEPHONE ENCOUNTER
Please let him know the CTs of the thoracic and lumbar have been approved. Since they didn't approve the CT left hip, I have ordered a left hip xray to start with. We will try to get the CT of the left hip afterward. Thanks!

## 2018-03-26 NOTE — TELEPHONE ENCOUNTER
Received a fax from [facility] it has been denied  CT Thoracic and CT Lumbar have been approved      Reason for decision and Criteria/guidelines used to make our decision: Based on [employer] [facility] Guidelines, we are unable to approve the study yo

## 2018-03-26 NOTE — TELEPHONE ENCOUNTER
Patient notified of CT Thoracic and Lumbar approval, advised to proceed with Hip Xray. Patient verbalized understanding, instructed to contact central scheduling dept.

## 2018-03-28 ENCOUNTER — TELEPHONE (OUTPATIENT)
Dept: FAMILY MEDICINE CLINIC | Facility: CLINIC | Age: 53
End: 2018-03-28

## 2018-03-30 RX ORDER — GABAPENTIN 400 MG/1
CAPSULE ORAL
Qty: 90 CAPSULE | Refills: 0 | OUTPATIENT
Start: 2018-03-30

## 2018-03-30 NOTE — TELEPHONE ENCOUNTER
Pain management recently seen an increased gabapentin 600 mg 3 times daily and sent #90 to pharmacy. Will not refill gabapentin 400 mg.   Please advise

## 2018-03-30 NOTE — TELEPHONE ENCOUNTER
Medication(s) to Refill:   Pending Prescriptions Disp Refills    GABAPENTIN 400 MG Oral Cap [Pharmacy Med Name: GABAPENTIN 400MG CAPSULES] 90 capsule 0     Sig: TAKE 1 CAPSULE(400 MG) BY MOUTH THREE TIMES DAILY             Reason for Medication Refill bein

## 2018-04-07 DIAGNOSIS — I71.03 DISSECTION OF THORACOABDOMINAL AORTA (HCC): ICD-10-CM

## 2018-04-09 ENCOUNTER — TELEPHONE (OUTPATIENT)
Dept: FAMILY MEDICINE CLINIC | Facility: CLINIC | Age: 53
End: 2018-04-09

## 2018-04-09 RX ORDER — ATORVASTATIN CALCIUM 10 MG/1
TABLET, FILM COATED ORAL
Qty: 30 TABLET | Refills: 0 | OUTPATIENT
Start: 2018-04-09

## 2018-04-09 NOTE — TELEPHONE ENCOUNTER
Patient would like to speak to a nurse regarding getting a new wheel chair for a car. Please call back [phone].

## 2018-04-09 NOTE — TELEPHONE ENCOUNTER
Spoke with pt, he is trying to get a lightweight wheelchair so that he can get around better and be able to transport wheelchair in the car.  Advised pt to contact [employer] as that is where he originally got his wheelchair and they may have more res

## 2018-04-10 DIAGNOSIS — B35.1 ONYCHOMYCOSIS: ICD-10-CM

## 2018-04-10 RX ORDER — KETOCONAZOLE 20 MG/G
CREAM TOPICAL
Qty: 60 G | Refills: 0 | Status: SHIPPED | OUTPATIENT
Start: 2018-04-10 | End: 2018-05-11

## 2018-04-12 RX ORDER — GABAPENTIN 600 MG/1
TABLET ORAL
Qty: 90 TABLET | Refills: 0 | Status: SHIPPED | OUTPATIENT
Start: 2018-04-12 | End: 2018-05-17

## 2018-04-12 NOTE — TELEPHONE ENCOUNTER
Medication: Gabapentin 600    Date of last refill: 3/12/18  [place]    Last office visit: 3/12/18  Due back to clinic per last office note:  None noted  Date next office visit scheduled:  4/23/18    Last OV note woody

## 2018-04-13 ENCOUNTER — TELEPHONE (OUTPATIENT)
Dept: SURGERY | Facility: CLINIC | Age: 53
End: 2018-04-13

## 2018-04-13 ENCOUNTER — HOSPITAL ENCOUNTER (OUTPATIENT)
Dept: GENERAL RADIOLOGY | Facility: HOSPITAL | Age: 53
Discharge: HOME | End: 2018-04-13
Attending: NURSE PRACTITIONER
Payer: MEDICAID

## 2018-04-13 ENCOUNTER — HOSPITAL ENCOUNTER (OUTPATIENT)
Dept: CT IMAGING | Facility: HOSPITAL | Age: 53
Discharge: HOME | End: 2018-04-13
Attending: NURSE PRACTITIONER
Payer: MEDICAID

## 2018-04-13 DIAGNOSIS — M25.562 CHRONIC PAIN OF LEFT KNEE: ICD-10-CM

## 2018-04-13 DIAGNOSIS — G89.29 CHRONIC LEFT HIP PAIN: ICD-10-CM

## 2018-04-13 DIAGNOSIS — G89.29 CHRONIC HIP PAIN, LEFT: ICD-10-CM

## 2018-04-13 DIAGNOSIS — Z87.828 HISTORY OF SPINAL CORD INJURY: ICD-10-CM

## 2018-04-13 DIAGNOSIS — Z79.891 LONG TERM (CURRENT) USE OF OPIATE ANALGESIC: Primary | ICD-10-CM

## 2018-04-13 DIAGNOSIS — M25.552 CHRONIC LEFT HIP PAIN: ICD-10-CM

## 2018-04-13 DIAGNOSIS — G89.29 CHRONIC PAIN OF LEFT KNEE: ICD-10-CM

## 2018-04-13 DIAGNOSIS — M25.552 CHRONIC HIP PAIN, LEFT: ICD-10-CM

## 2018-04-13 PROCEDURE — 73502 X-RAY EXAM HIP UNI 2-3 VIEWS: CPT | Performed by: NURSE PRACTITIONER

## 2018-04-13 PROCEDURE — 72128 CT CHEST SPINE W/O DYE: CPT | Performed by: NURSE PRACTITIONER

## 2018-04-13 PROCEDURE — 72131 CT LUMBAR SPINE W/O DYE: CPT | Performed by: NURSE PRACTITIONER

## 2018-04-13 PROCEDURE — 73560 X-RAY EXAM OF KNEE 1 OR 2: CPT | Performed by: NURSE PRACTITIONER

## 2018-04-13 NOTE — TELEPHONE ENCOUNTER
Medication: hydrocodone     Date of last refill: 3/12/18  [place] 3/09/29    Last office visit: 3/12/18  Due back to clinic per last office note:  None noted  Date next office visit scheduled:  4/23/18    Last OV note re

## 2018-04-16 RX ORDER — HYDROCODONE BITARTRATE AND ACETAMINOPHEN 5; 325 MG/1; MG/1
TABLET ORAL
Qty: 60 TABLET | Refills: 0 | OUTPATIENT
Start: 2018-04-16 | End: 2018-05-07

## 2018-04-16 NOTE — TELEPHONE ENCOUNTER
Please document last dose pain medication, have pt complete Narcotic agreement, and supply urine specimen prior to Rx pickup. Thank you.

## 2018-04-18 ENCOUNTER — TELEPHONE (OUTPATIENT)
Dept: FAMILY MEDICINE CLINIC | Facility: CLINIC | Age: 53
End: 2018-04-18

## 2018-04-18 RX ORDER — HYDROCODONE BITARTRATE AND ACETAMINOPHEN 5; 325 MG/1; MG/1
1 TABLET ORAL EVERY 12 HOURS PRN
Qty: 60 TABLET | Refills: 0 | Status: SHIPPED | OUTPATIENT
Start: 2018-04-18 | End: 2018-05-07

## 2018-04-18 NOTE — TELEPHONE ENCOUNTER
Spoke with pt and informed that Rx will be ready tomorrow. Pt states he is trying to get a ride, so it might be a couple days before he's able to pick it up. Office hours given to pt. No additional needs at this time.

## 2018-04-18 NOTE — TELEPHONE ENCOUNTER
Insurance will only cover 90 tablets for 365 days of terbinafine. Informed pt of coupon options through [employer].  Pt verbalized understanding

## 2018-04-23 ENCOUNTER — OFFICE VISIT (OUTPATIENT)
Dept: SURGERY | Facility: CLINIC | Age: 53
End: 2018-04-23

## 2018-04-23 VITALS
SYSTOLIC BLOOD PRESSURE: 106 MMHG | DIASTOLIC BLOOD PRESSURE: 67 MMHG | BODY MASS INDEX: 31.84 KG/M2 | HEART RATE: 76 BPM | WEIGHT: 215 LBS | HEIGHT: 69 IN | TEMPERATURE: 98 F

## 2018-04-23 DIAGNOSIS — R33.9 URINARY RETENTION: ICD-10-CM

## 2018-04-23 DIAGNOSIS — N52.9 ERECTILE DYSFUNCTION, UNSPECIFIED ERECTILE DYSFUNCTION TYPE: Primary | ICD-10-CM

## 2018-04-23 PROCEDURE — 99212 OFFICE O/P EST SF 10 MIN: CPT | Performed by: UROLOGY

## 2018-04-23 PROCEDURE — 99243 OFF/OP CNSLTJ NEW/EST LOW 30: CPT | Performed by: UROLOGY

## 2018-04-23 NOTE — PROGRESS NOTES
SUBJECTIVE:  Adonay Chung is a 52year old male who presents for a consultation at the request of, and a copy of this note will be sent to, Dr. Inocencio Garcia, for evaluation of  erectile dysfunction. He states that the problem is unchanged.  Symptoms inc indigestion, abdominal pains, bloody or tarry stools. GENERAL: Denies:  weight gain, weight loss, fever, night sweats, bone pain, malaise and fatigue.  Positive for:  None,  All other ROS reviewed and otherwise normal.    OBJECTIVE:  /67 (BP Location

## 2018-04-30 DIAGNOSIS — I10 ESSENTIAL HYPERTENSION: ICD-10-CM

## 2018-04-30 DIAGNOSIS — I71.03 DISSECTION OF THORACOABDOMINAL AORTA (HCC): ICD-10-CM

## 2018-05-01 NOTE — TELEPHONE ENCOUNTER
Pt requesting refill of isosorbide, no  protocol, unable to approve:     Last Time Medication was Filled:  1/31/18    Last Office Visit with PCP: 2/19/18    Future Appointments  Date Time Provider Miya Tirado   5/3/2018 1:30 PM Prince Mahmood MD [facility]

## 2018-05-02 RX ORDER — ISOSORBIDE MONONITRATE 30 MG/1
TABLET, EXTENDED RELEASE ORAL
Qty: 90 TABLET | Refills: 0 | Status: SHIPPED | OUTPATIENT
Start: 2018-05-02 | End: 2018-08-14

## 2018-05-03 ENCOUNTER — TELEPHONE (OUTPATIENT)
Dept: SURGERY | Facility: CLINIC | Age: 53
End: 2018-05-03

## 2018-05-03 ENCOUNTER — APPOINTMENT (OUTPATIENT)
Dept: LAB | Age: 53
End: 2018-05-03
Attending: NURSE PRACTITIONER
Payer: MEDICAID

## 2018-05-03 ENCOUNTER — OFFICE VISIT (OUTPATIENT)
Dept: SURGERY | Facility: CLINIC | Age: 53
End: 2018-05-03

## 2018-05-03 VITALS
HEART RATE: 72 BPM | HEIGHT: 69 IN | SYSTOLIC BLOOD PRESSURE: 110 MMHG | WEIGHT: 215 LBS | BODY MASS INDEX: 31.84 KG/M2 | DIASTOLIC BLOOD PRESSURE: 80 MMHG

## 2018-05-03 DIAGNOSIS — Z79.891 LONG TERM (CURRENT) USE OF OPIATE ANALGESIC: ICD-10-CM

## 2018-05-03 DIAGNOSIS — M25.562 CHRONIC PAIN OF LEFT KNEE: ICD-10-CM

## 2018-05-03 DIAGNOSIS — Z72.89 ALCOHOL USE: ICD-10-CM

## 2018-05-03 DIAGNOSIS — M25.552 CHRONIC LEFT HIP PAIN: ICD-10-CM

## 2018-05-03 DIAGNOSIS — R29.818 PARALYSIS OF LEG, LEFT, TRANSIENT: Primary | ICD-10-CM

## 2018-05-03 DIAGNOSIS — Z79.891 LONG TERM (CURRENT) USE OF OPIATE ANALGESIC: Primary | ICD-10-CM

## 2018-05-03 DIAGNOSIS — F14.10 COCAINE ABUSE (HCC): ICD-10-CM

## 2018-05-03 DIAGNOSIS — G89.29 OTHER CHRONIC PAIN: ICD-10-CM

## 2018-05-03 DIAGNOSIS — G89.29 CHRONIC PAIN OF LEFT KNEE: ICD-10-CM

## 2018-05-03 DIAGNOSIS — G89.29 CHRONIC LEFT HIP PAIN: ICD-10-CM

## 2018-05-03 PROCEDURE — 99213 OFFICE O/P EST LOW 20 MIN: CPT | Performed by: ANESTHESIOLOGY

## 2018-05-03 PROCEDURE — 80307 DRUG TEST PRSMV CHEM ANLYZR: CPT

## 2018-05-03 NOTE — TELEPHONE ENCOUNTER
Pt here in office for Rx pickup. Reviewed pain agreement with pt. Pt verbalized understanding and signed. Copy provided. States last dose of Norco was today. Pt sent to lab for urine specimen. Pt to return to pickup Rx. Document sent to scanning.  Snap

## 2018-05-03 NOTE — PATIENT INSTRUCTIONS
Refill policies:    • Allow 2-3 business days for refills; controlled substances may take longer.   • Contact your pharmacy at least 5 days prior to running out of medication and have them send an electronic request or submit request through the “request re entire amount billed. Precertification and Prior Authorizations: If your physician has recommended that you have a procedure or additional testing performed.   [facility]) will contact your insurance carrier to obtain pre-certi

## 2018-05-03 NOTE — PROGRESS NOTES
Name: Edwige Lamas   : 3/20/1965   DOS: 5/3/2018     Pain Clinic Follow Up Visit:   Patient presents with:   Follow - Up: hip pain      Edwige Lamas is a 52year old male with a history of aortic dissection and subsequent paraplegia who presents toda MOUTH DAILY Disp: 90 tablet Rfl: 0   ketoconazole 2 % External Cream  Disp:  Rfl: 1   atorvastatin 20 MG Oral Tab Take 1 tablet (20 mg total) by mouth nightly.  Disp: 90 tablet Rfl: 0   ESCITALOPRAM 20 MG Oral Tab TAKE 1 TABLET(20 MG) BY MOUTH DAILY Disp: 9 tricompartmental disease in the knee. I discussed possible follow-up with orthopedic surgeon. At this point, the patient would like to avoid any total hip arthroplasty if possible. I discussed conservative measures such as intra-articular hip injection.

## 2018-05-07 ENCOUNTER — TELEPHONE (OUTPATIENT)
Dept: SURGERY | Facility: CLINIC | Age: 53
End: 2018-05-07

## 2018-05-07 NOTE — TELEPHONE ENCOUNTER
I have made a referral to [facility] assistance drug treatment, he needs to get off of opioid medication and also cocaine which came up in recent UDS. They will contact him to get the process started.    Thanks

## 2018-05-07 NOTE — TELEPHONE ENCOUNTER
Called pt to notify him that he violated his Narcotic Agreement with [facility] and that our providers will not be prescribing Norco for him any more.  Pt stated that he is not able to talk right now and will call us back to further discuss his urine test.

## 2018-05-11 ENCOUNTER — OFFICE VISIT (OUTPATIENT)
Dept: PODIATRY CLINIC | Facility: CLINIC | Age: 53
End: 2018-05-11

## 2018-05-11 DIAGNOSIS — B35.1 ONYCHOMYCOSIS: ICD-10-CM

## 2018-05-11 DIAGNOSIS — M79.674 PAIN IN TOES OF BOTH FEET: Primary | ICD-10-CM

## 2018-05-11 DIAGNOSIS — M79.675 PAIN IN TOES OF BOTH FEET: Primary | ICD-10-CM

## 2018-05-11 PROCEDURE — 99212 OFFICE O/P EST SF 10 MIN: CPT | Performed by: PODIATRIST

## 2018-05-11 PROCEDURE — 11721 DEBRIDE NAIL 6 OR MORE: CPT | Performed by: PODIATRIST

## 2018-05-11 PROCEDURE — 99242 OFF/OP CONSLTJ NEW/EST SF 20: CPT | Performed by: PODIATRIST

## 2018-05-11 RX ORDER — KETOCONAZOLE 20 MG/G
CREAM TOPICAL
Qty: 60 G | Refills: 0 | Status: SHIPPED | OUTPATIENT
Start: 2018-05-11 | End: 2018-06-06

## 2018-05-11 NOTE — TELEPHONE ENCOUNTER
Pt requesting refill of Ketoconazole, no protocol, unable to approve:     Last Time Medication was Filled:  4/10/18    Last Office Visit with PCP: 2/19/18    Future Appointments  Date Time Provider Miya Tirado   5/11/2018 11:40 AM Jacquelyn Raymond

## 2018-05-11 NOTE — PROGRESS NOTES
HPI:    Patient ID: Jorge White is a 52year old male. HPI  This pleasant 26-year-old male presents as a new patient to me on consult from Wes Matias. Patient states that he is here for an examination and care associated with his toenails.   Patient EC TAKE 1 TABLET BY MOUTH EVERY DAY Disp:  Rfl:    Polyethylene Glycol 3350 Oral Powd Pack Take 17 g by mouth. Disp:  Rfl:    RaNITidine HCl 150 MG Oral Cap Take 1 capsule (150 mg total) by mouth 2 (two) times daily.  Disp: 60 capsule Rfl: 6   HydrOXYzine H

## 2018-05-14 DIAGNOSIS — I71.03 DISSECTION OF THORACOABDOMINAL AORTA (HCC): ICD-10-CM

## 2018-05-14 DIAGNOSIS — E78.2 MIXED HYPERLIPIDEMIA: ICD-10-CM

## 2018-05-14 RX ORDER — ATORVASTATIN CALCIUM 20 MG/1
TABLET, FILM COATED ORAL
Qty: 90 TABLET | Refills: 0 | Status: SHIPPED | OUTPATIENT
Start: 2018-05-14 | End: 2018-08-12

## 2018-05-14 NOTE — TELEPHONE ENCOUNTER
Pt requesting refill on Atorvastatin, protocol passed, refill approved    LOV 2/19/18    LF 2/19/18    No future appointments.

## 2018-05-17 RX ORDER — GABAPENTIN 600 MG/1
TABLET ORAL
Qty: 90 TABLET | Refills: 0 | Status: SHIPPED | OUTPATIENT
Start: 2018-05-17 | End: 2018-06-19

## 2018-05-17 NOTE — TELEPHONE ENCOUNTER
Spoke with Pharmacist and explained that our office did receive a refill request today and the Rx is being processed. No additional needs from [facility] at this time.

## 2018-05-31 DIAGNOSIS — I10 ESSENTIAL HYPERTENSION: ICD-10-CM

## 2018-05-31 DIAGNOSIS — I71.03 DISSECTION OF THORACOABDOMINAL AORTA (HCC): ICD-10-CM

## 2018-05-31 DIAGNOSIS — G47.09 OTHER INSOMNIA: ICD-10-CM

## 2018-05-31 RX ORDER — CARVEDILOL 25 MG/1
TABLET ORAL
Qty: 180 TABLET | Refills: 0 | Status: SHIPPED | OUTPATIENT
Start: 2018-05-31 | End: 2018-08-27

## 2018-05-31 RX ORDER — LOSARTAN POTASSIUM 25 MG/1
TABLET ORAL
Qty: 90 TABLET | Refills: 0 | Status: SHIPPED | OUTPATIENT
Start: 2018-05-31

## 2018-05-31 RX ORDER — HYDROXYZINE 50 MG/1
TABLET, FILM COATED ORAL
Qty: 30 TABLET | Refills: 0 | Status: SHIPPED | OUTPATIENT
Start: 2018-05-31 | End: 2018-08-20

## 2018-05-31 NOTE — TELEPHONE ENCOUNTER
Pt requesting refill of Hydroxyzine , no protocol , unable to approve:     Last Time Medication was Filled:  3/21/18    Last Office Visit with PCP: 2/19/18    No future appointments.       Pt also requesting refills on Losartan and Carvedilol, protocols pas

## 2018-06-06 DIAGNOSIS — B35.1 ONYCHOMYCOSIS: ICD-10-CM

## 2018-06-06 NOTE — TELEPHONE ENCOUNTER
Pt requesting refill of Ketoconazole, no protocol , unable to approve:     Last Time Medication was Filled:  5/11/18    Last Office Visit with PCP: 2/19/18    No future appointments.

## 2018-06-07 RX ORDER — KETOCONAZOLE 20 MG/G
CREAM TOPICAL
Qty: 60 G | Refills: 0 | Status: SHIPPED | OUTPATIENT
Start: 2018-06-07 | End: 2018-07-10

## 2018-06-18 ENCOUNTER — TELEPHONE (OUTPATIENT)
Dept: FAMILY MEDICINE CLINIC | Facility: CLINIC | Age: 53
End: 2018-06-18

## 2018-06-18 NOTE — TELEPHONE ENCOUNTER
Spoke with pt, requesting referral to [facility] for additional therapy. Informed last OV 2/19/18 and needs make an appointment with Dr. Royal Flynn. Also requested pt have [facility] fax records to our office.  PT declined to make an appointment at this time

## 2018-06-19 RX ORDER — GABAPENTIN 600 MG/1
TABLET ORAL
Qty: 90 TABLET | Refills: 0 | Status: SHIPPED | OUTPATIENT
Start: 2018-06-19 | End: 2018-07-22

## 2018-07-02 ENCOUNTER — TELEPHONE (OUTPATIENT)
Dept: SURGERY | Facility: CLINIC | Age: 53
End: 2018-07-02

## 2018-07-02 ENCOUNTER — TELEPHONE (OUTPATIENT)
Dept: FAMILY MEDICINE CLINIC | Facility: CLINIC | Age: 53
End: 2018-07-02

## 2018-07-02 NOTE — TELEPHONE ENCOUNTER
Pt states he needs clinical notes sent over to get cath order  refilled with [facility] - they told him to have RN call [phone] to give verbal for refill - pt is almost out of catheters

## 2018-07-02 NOTE — TELEPHONE ENCOUNTER
Pt called and asked if we received forms from his insurance company re authorization for his catheters. Informed pt that we have not received any forms, but to call his urologist whom is providing care for him for that matter.  Gave pt contact information f

## 2018-07-02 NOTE — TELEPHONE ENCOUNTER
Pt calling, states his [employer] company, [address] [place] [address] Dr paz has sent forms to this office requesting clinical notes regarding need for urinary catheters.  Informed pt that he needs to provide [facility] information for Dr Chris Waller as Dr Oj López does not connelly

## 2018-07-10 DIAGNOSIS — B35.1 ONYCHOMYCOSIS: ICD-10-CM

## 2018-07-10 NOTE — TELEPHONE ENCOUNTER
Pt requesting refill of Ketoconazole, no protocol  unable to approve:     Last Time Medication was Filled: 6/7/18    Last Office Visit with PCP: 2/19/18    Future Appointments  Date Time Provider Miya Tirado   7/19/2018 1:00 PM Sherry Hedrick DO [facility]

## 2018-07-11 RX ORDER — KETOCONAZOLE 20 MG/G
CREAM TOPICAL
Qty: 60 G | Refills: 0 | Status: SHIPPED | OUTPATIENT
Start: 2018-07-11 | End: 2018-08-02

## 2018-07-18 NOTE — TELEPHONE ENCOUNTER
Spoke to patient. Patient states he did get his catheters refill from [facility].  I then called [facility] at patient's request.  [facility] representative, Juventino Torres states they received an order for patient's catheters, new order will be for Aug is not ready

## 2018-07-23 ENCOUNTER — TELEPHONE (OUTPATIENT)
Dept: FAMILY MEDICINE CLINIC | Facility: CLINIC | Age: 53
End: 2018-07-23

## 2018-07-23 RX ORDER — GABAPENTIN 600 MG/1
TABLET ORAL
Qty: 90 TABLET | Refills: 0 | Status: SHIPPED | OUTPATIENT
Start: 2018-07-23 | End: 2018-08-27

## 2018-07-23 NOTE — TELEPHONE ENCOUNTER
Prior auth for Terbinafine 250 mg tablets (#30 take one tablet by mouth daily. Prior auth initiated. Ref # [id number]. 7/23/18 Time 5:23 CT.  Stated will take up to seven days to review

## 2018-07-24 ENCOUNTER — TELEPHONE (OUTPATIENT)
Dept: FAMILY MEDICINE CLINIC | Facility: CLINIC | Age: 53
End: 2018-07-24

## 2018-07-24 NOTE — TELEPHONE ENCOUNTER
Spoke with Sharon Perez at [employer], insurance covers 90 tablets of Terbinifine during a 365 day period. Pt has received the 90 days and no further medication will be covered by insurance. LMOM to return call to the office.  Provided pt office phone

## 2018-07-24 NOTE — TELEPHONE ENCOUNTER
Nathan from [address] called stating that they need clarification on some medication for pt.  He stated nurse can call back at [phone] and need to choose option 4, Record #: [id number]

## 2018-07-24 NOTE — TELEPHONE ENCOUNTER
Spoke with [employer] at [facility], pt insurance will only cover, 90 tablets per 365 days. Prior auth denied    LMOM to return call to the office. Provided pt office phone [phone] along with office hours given.     Spoke with pharmacy, informed

## 2018-07-27 NOTE — TELEPHONE ENCOUNTER
Left message for pt instructing him to contact the office with any questions regarding his prescription Office hours and phone # provided

## 2018-07-31 ENCOUNTER — TELEPHONE (OUTPATIENT)
Dept: FAMILY MEDICINE CLINIC | Facility: CLINIC | Age: 53
End: 2018-07-31

## 2018-08-01 NOTE — TELEPHONE ENCOUNTER
Pt is cardiac pt and needs to stay on nifedipine. Call pharmacy. If [facility] cannot find this medication, then please call pt and I will fax to different pharmacy. Please update me on pt decision. Thank you.

## 2018-08-02 DIAGNOSIS — B35.1 ONYCHOMYCOSIS: ICD-10-CM

## 2018-08-02 RX ORDER — KETOCONAZOLE 20 MG/G
CREAM TOPICAL
Qty: 60 G | Refills: 0 | Status: SHIPPED | OUTPATIENT
Start: 2018-08-02 | End: 2018-11-30

## 2018-08-02 NOTE — TELEPHONE ENCOUNTER
Pt requesting refill of Ketoconazole, no protocol unable to approve:     Last Time Medication was Filled:  7/11/18    Last Office Visit with PCP: 2/19/18    Future Appointments  Date Time Provider Miya Tirado   8/9/2018 1:40 PM Pauline Vaughn DO [facility]

## 2018-08-12 DIAGNOSIS — E78.2 MIXED HYPERLIPIDEMIA: ICD-10-CM

## 2018-08-12 DIAGNOSIS — I71.03 DISSECTION OF THORACOABDOMINAL AORTA (HCC): ICD-10-CM

## 2018-08-13 RX ORDER — ATORVASTATIN CALCIUM 20 MG/1
TABLET, FILM COATED ORAL
Qty: 90 TABLET | Refills: 0 | Status: SHIPPED | OUTPATIENT
Start: 2018-08-13 | End: 2018-11-24

## 2018-08-13 NOTE — TELEPHONE ENCOUNTER
Pt requesting refill on Atorvastatin, protocol passed, refill Approved    LOV 2/19/18    LF 5/14/18    No future appointments.

## 2018-08-14 DIAGNOSIS — I10 ESSENTIAL HYPERTENSION: ICD-10-CM

## 2018-08-14 DIAGNOSIS — I71.03 DISSECTION OF THORACOABDOMINAL AORTA (HCC): ICD-10-CM

## 2018-08-15 RX ORDER — ISOSORBIDE MONONITRATE 30 MG/1
TABLET, EXTENDED RELEASE ORAL
Qty: 90 TABLET | Refills: 0 | Status: SHIPPED | OUTPATIENT
Start: 2018-08-15 | End: 2018-11-24

## 2018-08-15 NOTE — TELEPHONE ENCOUNTER
Pt requesting refill of Isosorbide, no protocol, unable to approve:     Last Time Medication was Filled:  5/2/18    Last Office Visit with PCP: 2/19/18    No future appointments.

## 2018-08-20 DIAGNOSIS — G47.09 OTHER INSOMNIA: ICD-10-CM

## 2018-08-20 RX ORDER — HYDROXYZINE 50 MG/1
TABLET, FILM COATED ORAL
Qty: 30 TABLET | Refills: 0 | Status: SHIPPED | OUTPATIENT
Start: 2018-08-20 | End: 2018-09-26

## 2018-08-20 NOTE — TELEPHONE ENCOUNTER
Pt requesting refill of Hydroxyzine, no protocol , unable to approve:     Last Time Medication was Filled:  5/31/18    Last Office Visit with PCP: 2/19/18    No future appointments.

## 2018-08-27 DIAGNOSIS — I10 ESSENTIAL HYPERTENSION: ICD-10-CM

## 2018-08-27 DIAGNOSIS — I71.03 DISSECTION OF THORACOABDOMINAL AORTA (HCC): ICD-10-CM

## 2018-08-27 RX ORDER — NIFEDIPINE 90 MG/1
TABLET, FILM COATED, EXTENDED RELEASE ORAL
Qty: 90 TABLET | Refills: 0 | Status: SHIPPED | OUTPATIENT
Start: 2018-08-27 | End: 2018-11-30

## 2018-08-27 RX ORDER — CARVEDILOL 25 MG/1
TABLET ORAL
Qty: 180 TABLET | Refills: 0 | Status: SHIPPED | OUTPATIENT
Start: 2018-08-27 | End: 2018-11-06

## 2018-08-27 RX ORDER — GABAPENTIN 600 MG/1
TABLET ORAL
Qty: 90 TABLET | Refills: 0 | Status: SHIPPED | OUTPATIENT
Start: 2018-08-27 | End: 2018-09-30

## 2018-08-27 NOTE — TELEPHONE ENCOUNTER
Medication: GABAPENTIN 600 MG    Date of last refill: 7/23/18  [place]    Last office visit: 5/3/18  Due back to clinic per last office note:  As needed  Date next office visit scheduled:  None scheduled    Last URIN

## 2018-08-27 NOTE — TELEPHONE ENCOUNTER
Pt called requesting a refill of nifedipine and hydrochlorothiazide, protocol failed. Unable to approve. LOV 2/19/18  LF Nifedipine 2/21/18 #90, w 0 refills. Carvedilol 5/31/18 #180, 0 refills    No future appointments.

## 2018-09-26 DIAGNOSIS — G47.09 OTHER INSOMNIA: ICD-10-CM

## 2018-09-27 RX ORDER — HYDROXYZINE 50 MG/1
TABLET, FILM COATED ORAL
Qty: 30 TABLET | Refills: 0 | Status: SHIPPED | OUTPATIENT
Start: 2018-09-27 | End: 2018-11-09

## 2018-09-27 NOTE — TELEPHONE ENCOUNTER
PT requesting a refill of hydroxyzine, no protocol. Unable to approve.     LOV 2/19/18  LF 8/20/18 #30 with 0 refills    Future Appointments   Date Time Provider Miya Tirado   10/5/2018 10:20 AM Asha Traore, DO [facility] [place]

## 2018-09-30 DIAGNOSIS — F32.4 MAJOR DEPRESSIVE DISORDER WITH SINGLE EPISODE, IN PARTIAL REMISSION (HCC): ICD-10-CM

## 2018-10-01 NOTE — TELEPHONE ENCOUNTER
Pt requesting refill of Escitalopram, no protocol , unable to approve:     Last Time Medication was Filled:  1/31/18    Last Office Visit with PCP: 2/19/18    Future Appointments   Date Time Provider Miya Tirado   10/5/2018 10:20 AM ADITI Lundberg

## 2018-10-02 ENCOUNTER — TELEPHONE (OUTPATIENT)
Dept: FAMILY MEDICINE CLINIC | Facility: CLINIC | Age: 53
End: 2018-10-02

## 2018-10-02 RX ORDER — GABAPENTIN 600 MG/1
TABLET ORAL
Qty: 90 TABLET | Refills: 0 | Status: SHIPPED | OUTPATIENT
Start: 2018-10-02 | End: 2018-12-14

## 2018-10-02 RX ORDER — ESCITALOPRAM OXALATE 20 MG/1
TABLET ORAL
Qty: 90 TABLET | Refills: 0 | Status: SHIPPED | OUTPATIENT
Start: 2018-10-02 | End: 2018-11-09

## 2018-10-02 NOTE — TELEPHONE ENCOUNTER
Pt has been admitted to [facility] for \"Bl[facility]\"  MD requesting most recent CBC. Informed MD that patient last labs did not include CBC, only CMP and Lipid.     MD verbalized understanding, no further action needed

## 2018-10-18 ENCOUNTER — PATIENT OUTREACH (OUTPATIENT)
Dept: FAMILY MEDICINE CLINIC | Facility: CLINIC | Age: 53
End: 2018-10-18

## 2018-10-18 NOTE — PROGRESS NOTES
Called pt to schedule AWV, Pt states he is in the hospital and will call back to make appt once he is out.

## 2018-11-06 DIAGNOSIS — I71.03 DISSECTION OF THORACOABDOMINAL AORTA (HCC): ICD-10-CM

## 2018-11-06 RX ORDER — CARVEDILOL 25 MG/1
TABLET ORAL
Qty: 180 TABLET | Refills: 0 | Status: SHIPPED | OUTPATIENT
Start: 2018-11-06 | End: 2019-01-06

## 2018-11-06 NOTE — TELEPHONE ENCOUNTER
Pt requesting refill of Carvedilol, protocol failed, unable to approve:     Last Time Medication was Filled:  8/27/18    Last Office Visit with PCP: 2/19/18    No future appointments.

## 2018-11-09 ENCOUNTER — TELEPHONE (OUTPATIENT)
Dept: FAMILY MEDICINE CLINIC | Facility: CLINIC | Age: 53
End: 2018-11-09

## 2018-11-09 ENCOUNTER — OFFICE VISIT (OUTPATIENT)
Dept: FAMILY MEDICINE CLINIC | Facility: CLINIC | Age: 53
End: 2018-11-09
Payer: MEDICARE

## 2018-11-09 VITALS
HEART RATE: 73 BPM | OXYGEN SATURATION: 97 % | DIASTOLIC BLOOD PRESSURE: 68 MMHG | TEMPERATURE: 98 F | SYSTOLIC BLOOD PRESSURE: 112 MMHG

## 2018-11-09 DIAGNOSIS — I71.03 DISSECTION OF THORACOABDOMINAL AORTA (HCC): ICD-10-CM

## 2018-11-09 DIAGNOSIS — N31.9 NEUROGENIC BLADDER: ICD-10-CM

## 2018-11-09 DIAGNOSIS — Z23 NEED FOR PROPHYLACTIC VACCINATION WITH STREPTOCOCCUS PNEUMONIAE (PNEUMOCOCCUS) AND INFLUENZA VACCINES: ICD-10-CM

## 2018-11-09 DIAGNOSIS — I10 ESSENTIAL (PRIMARY) HYPERTENSION: ICD-10-CM

## 2018-11-09 DIAGNOSIS — Z23 NEED FOR TDAP VACCINATION: ICD-10-CM

## 2018-11-09 DIAGNOSIS — Z12.5 ENCOUNTER FOR SCREENING FOR MALIGNANT NEOPLASM OF PROSTATE: ICD-10-CM

## 2018-11-09 DIAGNOSIS — R29.898 LEFT LEG WEAKNESS: ICD-10-CM

## 2018-11-09 DIAGNOSIS — R29.898 MUSCULAR DECONDITIONING: ICD-10-CM

## 2018-11-09 DIAGNOSIS — Z23 NEED FOR PNEUMOCOCCAL VACCINATION: ICD-10-CM

## 2018-11-09 DIAGNOSIS — R29.818 PARALYSIS OF LEG, LEFT, TRANSIENT: ICD-10-CM

## 2018-11-09 DIAGNOSIS — Z11.59 NEED FOR HEPATITIS C SCREENING TEST: ICD-10-CM

## 2018-11-09 DIAGNOSIS — Z23 NEED FOR IMMUNIZATION AGAINST INFLUENZA: ICD-10-CM

## 2018-11-09 DIAGNOSIS — Z91.81 HISTORY OF FALL: ICD-10-CM

## 2018-11-09 DIAGNOSIS — F32.4 MAJOR DEPRESSIVE DISORDER WITH SINGLE EPISODE, IN PARTIAL REMISSION (HCC): ICD-10-CM

## 2018-11-09 DIAGNOSIS — Z12.11 SCREENING FOR COLON CANCER: ICD-10-CM

## 2018-11-09 DIAGNOSIS — F41.9 ANXIETY: ICD-10-CM

## 2018-11-09 DIAGNOSIS — Z78.9 FULL CODE STATUS: ICD-10-CM

## 2018-11-09 DIAGNOSIS — R19.5 OCCULT BLOOD POSITIVE STOOL: ICD-10-CM

## 2018-11-09 DIAGNOSIS — M79.89 LEFT LEG SWELLING: ICD-10-CM

## 2018-11-09 DIAGNOSIS — C92.10 CML (CHRONIC MYELOCYTIC LEUKEMIA) (HCC): ICD-10-CM

## 2018-11-09 DIAGNOSIS — Z00.00 ENCOUNTER FOR ANNUAL HEALTH EXAMINATION: Primary | ICD-10-CM

## 2018-11-09 DIAGNOSIS — E78.2 MIXED HYPERLIPIDEMIA: ICD-10-CM

## 2018-11-09 PROCEDURE — 90670 PCV13 VACCINE IM: CPT | Performed by: FAMILY MEDICINE

## 2018-11-09 PROCEDURE — 99214 OFFICE O/P EST MOD 30 MIN: CPT | Performed by: FAMILY MEDICINE

## 2018-11-09 PROCEDURE — 1111F DSCHRG MED/CURRENT MED MERGE: CPT | Performed by: FAMILY MEDICINE

## 2018-11-09 PROCEDURE — G0402 INITIAL PREVENTIVE EXAM: HCPCS | Performed by: FAMILY MEDICINE

## 2018-11-09 PROCEDURE — G0009 ADMIN PNEUMOCOCCAL VACCINE: HCPCS | Performed by: FAMILY MEDICINE

## 2018-11-09 RX ORDER — POLYETHYLENE GLYCOL 3350 17 G/17G
17 POWDER, FOR SOLUTION ORAL DAILY
Qty: 100 EACH | Refills: 3 | Status: SHIPPED | OUTPATIENT
Start: 2018-11-09

## 2018-11-09 RX ORDER — CLONAZEPAM 0.5 MG/1
0.5 TABLET ORAL 2 TIMES DAILY PRN
Qty: 60 TABLET | Refills: 1 | Status: SHIPPED | OUTPATIENT
Start: 2018-11-09

## 2018-11-09 NOTE — TELEPHONE ENCOUNTER
Patient signed HIPAA medical records authorization form for the Facility identified below to disclose patient health information to Sharmin Roberts / Provider Name: [id number] Address: [id number]  Rachelle [address]

## 2018-11-09 NOTE — PATIENT INSTRUCTIONS
[place] SCREENING SCHEDULE   Tests on this list are recommended by your physician but may not be covered, or covered at this frequency, by your insurer. Please check with your insurance carrier before scheduling to verify coverage.     PREVENTATI Colonoscopy due on 03/20/1965 Update [facility] if applicable    Flex Sigmoidoscopy Screen  Covered every 5 years No results found for this or any previous visit. No flowsheet data found.      Fecal Occult Blood   Covered Annually No results found f (Not covered by Medicare Part B) No orders found for this or any previous visit. This may be covered with your pharmacy  prescription benefits     Recommended Websites for Advanced Directives    [email or web address].no. org/publications/Documents/personal_dec. pdf the colon from the small intestine. As it travels through the colon, the waste (stool) loses water and becomes more solid. Intestinal muscles push it toward the sigmoid colon. This is the last section of the colon.  Stool then moves into the rectum, where i Follow the instructions carefully for using this kit. You might need to not eat certain foods and not take certain medicines before the test, as directed. Stool DNA test  This test looks for DNA changes in cells in the stool.  These DNA changes might be si it exposes you to a small amount of radiation. Scope exams  Here are 2 types of scope exams:  · Colonoscopy. This test can be used to find and remove polyps anywhere in the colon or rectum. The day before the test, you will do a bowel prep.  This is a liqu 100.4°F (38°C) or higher, or as directed by your healthcare provider  · Abdominal pain  · Vomiting   Date Last Reviewed: 12/1/2017  © 8080-0777 [employer]. [address] [facility], [address] [place] [address]. All rights reserved.  This information is n doctor right away or at a future visit. During the test   The test is usually done in the hospital on an outpatient basis. This means you go home the same day. The procedure takes about 30 minutes.  During that time:  · You are given relaxing (sedating) me

## 2018-11-09 NOTE — PROGRESS NOTES
HPI:   Cali Ku is a 52year old male who presents for a Medicare Initial Annual Wellness visit (Once after 12 month Medicare anniversary) .     CML-recently diagnosed with dizziness and vomiting went to [facility] emergency room and found to have antibiotics. Patient presents for recheck of hypertension. Pt has been taking medications as instructed, no medication side effects,no  home BP monitoring . Received blood transfusion last hospital admission.   Never been tested for hepatitis C      n Toileting difficulties based on screening of functional status. Toileting: Need some help    He has Driving difficulties based on screening of functional status.    Driving: Cannot do without help   He has Meal Preparation difficulties based on screening arterial insufficiency     Chronic left hip pain     Chronic pain of left knee     Essential hypertension     Major depressive disorder with single episode, in partial remission (HCC)     Paralysis of leg, left, transient     Dissection of thoracoabdominal lesions  EYES: denies blurred vision or double vision  HEENT: denies nasal congestion, sinus pain or ST  LUNGS: denies shortness of breath with exertion  CARDIOVASCULAR: denies chest pain on exertion  GI: denies abdominal pain, denies heartburn  : 2 per four quadrants,  no masses, no organomegaly   Genitalia:  Declined   Rectal:  Declined requested urologist   Extremities: Extremities normal, atraumatic, no cyanosis or edema except chronic left lower extremity edema and paralysis of left leg.   Now only ab muscle once for 1 dose.  OK to substitute pharmacies available TDaP vaccine brand    Essential (primary) hypertension   -     TSH W REFLEX TO FREE T4; Future  Blood pressure controlled, encouraged home blood pressure cuff use 5-7 days a week and continue cu daily as needed for Anxiety (No driving or operating machinery for 8 hours after ingestion). Continue escitalopram.    Appears controlled. Needs office visits every 6 months for refills.     Full code status  -     FULL CODE    Occult blood positive stool practice for refills on his medication.    Jena Mai DO, 11/9/2018     General Health     In the past six months, have you lost more than 10 pounds without trying?: 2 - No  Has your appetite been poor?: No  How does the patient maintain a good energy l history found Please get once after your 63th birthday    Hepatitis B for Moderate/High Risk No vaccine history found Medium/high risk factors:   End-stage renal disease   Hemophiliacs who received Factor VIII or IX concentrates   Clients of institutions f

## 2018-11-10 ENCOUNTER — TELEPHONE (OUTPATIENT)
Dept: FAMILY MEDICINE CLINIC | Facility: CLINIC | Age: 53
End: 2018-11-10

## 2018-11-11 ENCOUNTER — TELEPHONE (OUTPATIENT)
Dept: FAMILY MEDICINE CLINIC | Facility: CLINIC | Age: 53
End: 2018-11-11

## 2018-11-11 PROBLEM — R19.5 OCCULT BLOOD POSITIVE STOOL: Status: ACTIVE | Noted: 2018-11-11

## 2018-11-12 RX ORDER — OMEPRAZOLE 20 MG/1
CAPSULE, DELAYED RELEASE ORAL
Qty: 90 CAPSULE | Refills: 0 | OUTPATIENT
Start: 2018-11-12

## 2018-11-12 NOTE — TELEPHONE ENCOUNTER
Occult blood positive stools recent admission to [facility]. Reviewed recent discharge summary from [facility] please refer patient to Zaid Caldera MD [phone], [phone].     Also discussed at office visit patient needs to f

## 2018-11-12 NOTE — TELEPHONE ENCOUNTER
Spoke with pt, informed him of Gastro MD and need to follow up. Pt verbalized understanding.  Pt states that he has been recommended to see Dr Tere Ordonez as a primary care MD and will make an appointment ASAP

## 2018-11-14 ENCOUNTER — TELEPHONE (OUTPATIENT)
Dept: SURGERY | Facility: CLINIC | Age: 53
End: 2018-11-14

## 2018-11-14 NOTE — TELEPHONE ENCOUNTER
Pt asking for RN to call [facility] to get cath supplies - he is having a hard time getting them - they told him to have office call them

## 2018-11-15 NOTE — TELEPHONE ENCOUNTER
Spoke with Vanesa Bradley at [facility] and informed her that [facility] gave auth to refill pt's CIC caths and that he had the NP TRICIA sign the script that I pended to him.  She took a verbal refill order and stated that she will fax a script and we should have the K

## 2018-11-15 NOTE — TELEPHONE ENCOUNTER
Spoke with pt and determined that [facility] told him that our office needs to call in order for them to send a refill of his CIC caths.  I told him that they probably need a new script because he was getting them thru another doctor (Dr. Faith Shannon) w

## 2018-11-19 ENCOUNTER — TELEPHONE (OUTPATIENT)
Dept: FAMILY MEDICINE CLINIC | Facility: CLINIC | Age: 53
End: 2018-11-19

## 2018-11-19 NOTE — TELEPHONE ENCOUNTER
Left message for pt to schedule appointment to discuss sleep study.  Provided office hours and phone #

## 2018-11-19 NOTE — TELEPHONE ENCOUNTER
Patient states he needs an order for sleep test. Would like to know if he can get that ordered or does he need appointment.      [phone]

## 2018-11-24 DIAGNOSIS — I10 ESSENTIAL HYPERTENSION: ICD-10-CM

## 2018-11-24 DIAGNOSIS — E78.2 MIXED HYPERLIPIDEMIA: ICD-10-CM

## 2018-11-24 DIAGNOSIS — I71.03 DISSECTION OF THORACOABDOMINAL AORTA (HCC): ICD-10-CM

## 2018-11-26 DIAGNOSIS — G47.09 OTHER INSOMNIA: ICD-10-CM

## 2018-11-26 RX ORDER — ATORVASTATIN CALCIUM 20 MG/1
TABLET, FILM COATED ORAL
Qty: 90 TABLET | Refills: 0 | Status: SHIPPED | OUTPATIENT
Start: 2018-11-26

## 2018-11-26 RX ORDER — HYDROXYZINE 50 MG/1
TABLET, FILM COATED ORAL
Qty: 30 TABLET | Refills: 0 | Status: SHIPPED | OUTPATIENT
Start: 2018-11-26 | End: 2018-11-30

## 2018-11-26 RX ORDER — ISOSORBIDE MONONITRATE 30 MG/1
TABLET, EXTENDED RELEASE ORAL
Qty: 90 TABLET | Refills: 0 | Status: SHIPPED | OUTPATIENT
Start: 2018-11-26

## 2018-11-26 NOTE — TELEPHONE ENCOUNTER
PT requesting a refill of atorvastatin, protocol passed. Refill approved. Pt also requesting a refill of isosorbide, no protocol. Unable to approve.     LOV 11/9/18    LF atorvastatin 8/13/18 #90             isosorbide 8/15/18 #90

## 2018-11-26 NOTE — TELEPHONE ENCOUNTER
PT scheduled appt with MD to discuss    Future Appointments   Date Time Provider Miya Tirado   11/30/2018  2:00 PM Rafael Schwartz,  [facility] [place]   2/8/2019 10:40 AM Rafael Schwartz,  [facility] [place]

## 2018-11-30 ENCOUNTER — OFFICE VISIT (OUTPATIENT)
Dept: FAMILY MEDICINE CLINIC | Facility: CLINIC | Age: 53
End: 2018-11-30
Payer: MEDICARE

## 2018-11-30 VITALS
DIASTOLIC BLOOD PRESSURE: 66 MMHG | HEART RATE: 77 BPM | SYSTOLIC BLOOD PRESSURE: 110 MMHG | RESPIRATION RATE: 18 BRPM | TEMPERATURE: 98 F | OXYGEN SATURATION: 98 %

## 2018-11-30 DIAGNOSIS — R06.81 APNEA: ICD-10-CM

## 2018-11-30 DIAGNOSIS — R06.83 SNORING: Primary | ICD-10-CM

## 2018-11-30 DIAGNOSIS — Z87.898 HISTORY OF WHEEZING: ICD-10-CM

## 2018-11-30 DIAGNOSIS — G47.09 OTHER INSOMNIA: ICD-10-CM

## 2018-11-30 DIAGNOSIS — I10 ESSENTIAL HYPERTENSION: ICD-10-CM

## 2018-11-30 DIAGNOSIS — B35.1 ONYCHOMYCOSIS: ICD-10-CM

## 2018-11-30 DIAGNOSIS — Z12.11 ENCOUNTER FOR SCREENING COLONOSCOPY: ICD-10-CM

## 2018-11-30 DIAGNOSIS — I71.03 DISSECTION OF THORACOABDOMINAL AORTA (HCC): ICD-10-CM

## 2018-11-30 DIAGNOSIS — E78.2 MIXED HYPERLIPIDEMIA: ICD-10-CM

## 2018-11-30 PROCEDURE — 99214 OFFICE O/P EST MOD 30 MIN: CPT | Performed by: FAMILY MEDICINE

## 2018-11-30 RX ORDER — KETOCONAZOLE 20 MG/G
CREAM TOPICAL
Qty: 60 G | Refills: 0 | Status: SHIPPED | OUTPATIENT
Start: 2018-11-30

## 2018-11-30 RX ORDER — ALLOPURINOL 100 MG/1
100 TABLET ORAL DAILY
Refills: 0 | COMMUNITY
Start: 2018-10-31

## 2018-11-30 RX ORDER — ALBUTEROL SULFATE 90 UG/1
2 AEROSOL, METERED RESPIRATORY (INHALATION) EVERY 4 HOURS PRN
Qty: 1 INHALER | Refills: 0 | Status: SHIPPED | OUTPATIENT
Start: 2018-11-30 | End: 2018-11-30

## 2018-11-30 RX ORDER — HYDROXYZINE 50 MG/1
100 TABLET, FILM COATED ORAL NIGHTLY PRN
Qty: 60 TABLET | Refills: 0 | Status: SHIPPED | OUTPATIENT
Start: 2018-11-30

## 2018-11-30 NOTE — PATIENT INSTRUCTIONS
What Are Snoring and Obstructive Sleep Apnea? If [facility] ever had a stuffed-up nose, you know the feeling of trying to breathe through a very narrow passageway. This is what happens in your throat when you snore.  While you sleep, structures in your throa Problems in the structure of the nose may block breathing. A crooked (deviated) septum or swollen turbinates can make snoring worse or lead to apnea. Also, a receding jaw may make the tongue sit too far back.  Then it’s more likely to block the airway when If the structures partly or completely block the throat, air can’t flow to the lungs at all. This is called hypopnea (decreased breathing) or apnea (meaning “no breathing”). The lungs aren’t getting fresh air.  So the brain tells the body to wake up just en Your risk of having colorectal cancer increases if you:  · Are 48years of age or older  · Have a family history or personal history of colorectal cancer or polyps  · Have a personal history of type 2 diabetes, Crohn’s disease, or ulcerative colitis  · Hav · Digital rectal exam (RADHA). During a RADHA, the healthcare provider inserts a lubricated gloved finger into the rectum. The test is painless and takes less than a minute. This test alone is not enough to screen for colorectal cancer.  You will also need one This exam is also called a CT colonography. It uses a series of X-ray photographs to create a 3-D view of the colon and rectum. The day before the test, you will need to do a bowel prep to clean out your colon.  Your healthcare provider will give you instru · Sigmoidoscopy. This test is similar to colonoscopy, but focuses only on the sigmoid colon and rectum. As with colonoscopy, bowel prep must be done the day before this test. It might not need to be as complete as the bowel prep for a colonoscopy.  You are

## 2018-11-30 NOTE — PROGRESS NOTES
CHIEF COMPLAINT: Patient presents with:  Sleep Problem: x3 years, snoring      HPI:     Bambi Frederick is a 52year old male presents for concern of sleep apnea. Snoring at night and sometimes wakes up due to apnea. States his brother has sleep apnea.   Meg Goodman Cigarettes        Quit date: 10/6/2014        Years since quittin.1      Smokeless tobacco: Never Used    Alcohol use:  Yes      Alcohol/week: 1.8 oz      Types: 3 Standard drinks or equivalent per week    Drug use: No       Medications (Active prior to BY MOUTH DAILY Disp: 90 tablet Rfl: 0   RaNITidine HCl 150 MG Oral Cap Take 1 capsule (150 mg total) by mouth 2 (two) times daily.  Disp: 60 capsule Rfl: 6       Allergies:  No Known Allergies    PSFH elements reviewed from today and agreed except as otherw DAILY. MAX 7 DAYS OR CAN INJURY SKIN. NEVER APPLY FACE  Dispense: 60 g; Refill: 0    2. Snoring  3.  Apnea  Needs to call and complete and will forward results to new PCP  - OP REFERRAL TO DIAGNOSTIC SLEEP STUDY    4. Other insomnia  Long discussion over her AFFECTED AREA TWICE DAILY. MAX 7 DAYS OR CAN INJURY SKIN. NEVER APPLY FACE   • HydrOXYzine HCl 50 MG Oral Tab 60 tablet 0     Sig: Take 2 tablets (100 mg total) by mouth nightly as needed for Itching.        Health Maintenance:  Colonoscopy due on 03/20/1965

## 2018-12-02 DIAGNOSIS — I10 ESSENTIAL HYPERTENSION: ICD-10-CM

## 2018-12-02 DIAGNOSIS — I71.03 DISSECTION OF THORACOABDOMINAL AORTA (HCC): ICD-10-CM

## 2018-12-03 RX ORDER — NIFEDIPINE 90 MG/1
TABLET, FILM COATED, EXTENDED RELEASE ORAL
Qty: 90 TABLET | Refills: 0 | OUTPATIENT
Start: 2018-12-03

## 2018-12-05 ENCOUNTER — TELEPHONE (OUTPATIENT)
Dept: FAMILY MEDICINE CLINIC | Facility: CLINIC | Age: 53
End: 2018-12-05

## 2018-12-06 NOTE — TELEPHONE ENCOUNTER
Patient called stating that he mixed up his night and day medication and that he was short of breath.   Asked if nurse could call him [phone]

## 2018-12-06 NOTE — TELEPHONE ENCOUNTER
Spoke with pt, he got up on Tuesday, 12/4/18 and accidentally took night meds instead of day meds, he slept most of the day and had a hard time sleeping that night. Pt thought he was breathing shallow and asked his caregiver to come over and monitor him.  H

## 2018-12-07 ENCOUNTER — TELEPHONE (OUTPATIENT)
Dept: FAMILY MEDICINE CLINIC | Facility: CLINIC | Age: 53
End: 2018-12-07

## 2018-12-10 ENCOUNTER — TELEPHONE (OUTPATIENT)
Dept: SURGERY | Facility: CLINIC | Age: 53
End: 2018-12-10

## 2018-12-10 NOTE — TELEPHONE ENCOUNTER
[facility] Rep., calling to f/up on fax sent on 12/6 of detailed written order, which needs to be completed and faxed back.

## 2018-12-12 ENCOUNTER — OFFICE VISIT (OUTPATIENT)
Dept: SLEEP CENTER | Facility: HOSPITAL | Age: 53
End: 2018-12-12
Attending: FAMILY MEDICINE
Payer: MEDICARE

## 2018-12-12 PROCEDURE — 95810 POLYSOM 6/> YRS 4/> PARAM: CPT

## 2018-12-14 DIAGNOSIS — M25.552 CHRONIC LEFT HIP PAIN: Primary | ICD-10-CM

## 2018-12-14 DIAGNOSIS — G89.29 CHRONIC LEFT HIP PAIN: Primary | ICD-10-CM

## 2018-12-14 NOTE — TELEPHONE ENCOUNTER
Forwarded this message to [facility] AP[facility]. Also, placed form on Asia's desk for signature. Thanks. [facility], please see written order I placed on your desk, and please sign. please see 11/14/18 Medication Question telephone encounter.

## 2018-12-17 RX ORDER — ESCITALOPRAM OXALATE 20 MG/1
TABLET ORAL
Qty: 90 TABLET | Refills: 0 | OUTPATIENT
Start: 2018-12-17

## 2018-12-17 RX ORDER — GABAPENTIN 600 MG/1
TABLET ORAL
Qty: 90 TABLET | Refills: 0 | Status: SHIPPED | OUTPATIENT
Start: 2018-12-17 | End: 2019-01-11

## 2018-12-17 NOTE — TELEPHONE ENCOUNTER
Pt requesting refill of Escitalopram, no protocol , unable to approve:     Last Time Medication was Filled:  1/31/18    Last Office Visit with PCP: 11/30/18    Pt requesting refill of Albuterol, protocol failed, unable to approve:     Last Time Medication

## 2018-12-18 NOTE — TELEPHONE ENCOUNTER
Spoke with pt, instructed him to Contact new PCP for refills. Pt reports that he saw Dr Fredi Durham on 12/15/18.  Pt verbalized understanding

## 2018-12-18 NOTE — PROCEDURES
[address] [address],[facility] [address]       Accredited by the [facility] ([facility])    PATIENT'S NAME:        Davi Lacey PHYSICIAN:   Darwin Sims M.D.   REFERRING PHYSICIAN:   Danielle Galeano, [address] hour.  Supine AHI was 110. Lateral AHI was 74. REM AHI was 72. Oxygen saturation kalin was 56%, and the patient spent approximately 59% of sleep time with oxygen levels below 90%. PERIODIC LIMB MOVEMENTS:  PLM index was 0.     EKG:  Normal sinus rhyth

## 2018-12-18 NOTE — TELEPHONE ENCOUNTER
Pt had appt on Dec 15 with DR Damaso Maldonado new pcp at [facility] to work with oncologist and specialists. Please call pt and update status. Needs new pcp [facility] since cardiologist , ER visits and oncologist all at [facility]. Please remind pt.  Has appt schedluled in

## 2019-01-03 ENCOUNTER — TELEPHONE (OUTPATIENT)
Dept: FAMILY MEDICINE CLINIC | Facility: CLINIC | Age: 54
End: 2019-01-03

## 2019-01-06 ENCOUNTER — TELEPHONE (OUTPATIENT)
Dept: FAMILY MEDICINE CLINIC | Facility: CLINIC | Age: 54
End: 2019-01-06

## 2019-01-06 DIAGNOSIS — F32.4 MAJOR DEPRESSIVE DISORDER WITH SINGLE EPISODE, IN PARTIAL REMISSION (HCC): ICD-10-CM

## 2019-01-06 DIAGNOSIS — I71.03 DISSECTION OF THORACOABDOMINAL AORTA (HCC): ICD-10-CM

## 2019-01-09 RX ORDER — CARVEDILOL 25 MG/1
TABLET ORAL
Qty: 270 TABLET | Refills: 0 | Status: SHIPPED | OUTPATIENT
Start: 2019-01-09 | End: 2019-01-11 | Stop reason: CLARIF

## 2019-01-09 RX ORDER — ESCITALOPRAM OXALATE 20 MG/1
TABLET ORAL
Qty: 90 TABLET | Refills: 0 | OUTPATIENT
Start: 2019-01-09

## 2019-01-09 NOTE — TELEPHONE ENCOUNTER
PT requests a refill of carvedilil, protocol passed. Refill approved. PT also requests a refill of escitalopram.no protocol. Unable to approve.     LOV with PCP 11/30/18    LF carvedilol 11/6/18 #180    Lf escitalopram 1/31/18 #90    Future Appointments

## 2019-01-10 NOTE — TELEPHONE ENCOUNTER
Pt and I discussed him establishing with new pcp due to cancer diagnosis and all other care at [facility].  He stated had appt in 12/2018 with new physician, He needs to call for refills and overdue f>1 year with cardiologist. Cardiology should manage his

## 2019-01-11 DIAGNOSIS — M25.552 CHRONIC LEFT HIP PAIN: ICD-10-CM

## 2019-01-11 DIAGNOSIS — I10 ESSENTIAL HYPERTENSION: ICD-10-CM

## 2019-01-11 DIAGNOSIS — B35.1 ONYCHOMYCOSIS: ICD-10-CM

## 2019-01-11 DIAGNOSIS — I71.03 DISSECTION OF THORACOABDOMINAL AORTA (HCC): ICD-10-CM

## 2019-01-11 DIAGNOSIS — E78.2 MIXED HYPERLIPIDEMIA: ICD-10-CM

## 2019-01-11 DIAGNOSIS — G47.09 OTHER INSOMNIA: ICD-10-CM

## 2019-01-11 DIAGNOSIS — F41.9 ANXIETY: ICD-10-CM

## 2019-01-11 DIAGNOSIS — G89.29 CHRONIC LEFT HIP PAIN: ICD-10-CM

## 2019-01-11 RX ORDER — LOSARTAN POTASSIUM 25 MG/1
TABLET ORAL
Qty: 90 TABLET | Refills: 0 | OUTPATIENT
Start: 2019-01-11

## 2019-01-11 RX ORDER — KETOCONAZOLE 20 MG/G
CREAM TOPICAL
Qty: 60 G | Refills: 0 | OUTPATIENT
Start: 2019-01-11

## 2019-01-11 RX ORDER — ISOSORBIDE MONONITRATE 30 MG/1
TABLET, EXTENDED RELEASE ORAL
Qty: 90 TABLET | Refills: 0 | OUTPATIENT
Start: 2019-01-11

## 2019-01-11 RX ORDER — TERBINAFINE HYDROCHLORIDE 250 MG/1
TABLET ORAL
Qty: 30 TABLET | Refills: 0 | OUTPATIENT
Start: 2019-01-11

## 2019-01-11 RX ORDER — HYDROXYZINE 50 MG/1
TABLET, FILM COATED ORAL
Qty: 60 TABLET | Refills: 0 | OUTPATIENT
Start: 2019-01-11

## 2019-01-11 RX ORDER — ATORVASTATIN CALCIUM 20 MG/1
TABLET, FILM COATED ORAL
Qty: 90 TABLET | Refills: 0 | OUTPATIENT
Start: 2019-01-11

## 2019-01-11 RX ORDER — HYDROXYZINE 50 MG/1
TABLET, FILM COATED ORAL
Qty: 30 TABLET | Refills: 0 | OUTPATIENT
Start: 2019-01-11

## 2019-01-11 RX ORDER — CLONAZEPAM 0.5 MG/1
TABLET ORAL
Qty: 60 TABLET | Refills: 0 | OUTPATIENT
Start: 2019-01-11

## 2019-01-11 NOTE — TELEPHONE ENCOUNTER
All prescriptions denied, spoke to pt(please see telephone encounter from 1/6/19)    Pt has new PCP, and spoke with pharmacy as well regarding Dr. Charlotte Martin no longer filling refills. Pt is aware and verbalizes understanding.

## 2019-01-11 NOTE — TELEPHONE ENCOUNTER
LMOM to return call to the office. Provided pt office phone [phone] along with office hours given.

## 2019-01-11 NOTE — TELEPHONE ENCOUNTER
See previous encounter from 1/6/19. Pt advised to get further refills from new established PCP. Pt no longer under providers care. Pt verbalized understanding.

## 2019-01-11 NOTE — TELEPHONE ENCOUNTER
Pt states Dr Odette Amaya is no longer his PCP. States new primary is Dr. Louann Sequeira and he already established with the new PCP 12/2018. Informed pt refills would have to go through new PCP and carvedilol would have go through cardiology.  Pt verbalizes under

## 2019-01-14 RX ORDER — GABAPENTIN 600 MG/1
TABLET ORAL
Qty: 90 TABLET | Refills: 0 | Status: SHIPPED | OUTPATIENT
Start: 2019-01-14 | End: 2019-02-11

## 2019-01-14 NOTE — TELEPHONE ENCOUNTER
Medication: GABAPENTIN 600 MG Oral Tab    Date of last refill: 12/17/18  [place]    Last office visit: 5/3/18  Due back to clinic per last office note:  He can follow-up with Dr. Yeni Tsang or Amanda on an as-needed basis  Da

## 2019-01-18 ENCOUNTER — OFFICE VISIT (OUTPATIENT)
Dept: SURGERY | Facility: CLINIC | Age: 54
End: 2019-01-18
Payer: MEDICARE

## 2019-01-18 VITALS
TEMPERATURE: 99 F | SYSTOLIC BLOOD PRESSURE: 96 MMHG | WEIGHT: 217 LBS | HEART RATE: 81 BPM | HEIGHT: 69 IN | BODY MASS INDEX: 32.14 KG/M2 | DIASTOLIC BLOOD PRESSURE: 60 MMHG

## 2019-01-18 DIAGNOSIS — R33.9 URINARY RETENTION: Primary | ICD-10-CM

## 2019-01-18 DIAGNOSIS — N52.9 ERECTILE DYSFUNCTION, UNSPECIFIED ERECTILE DYSFUNCTION TYPE: ICD-10-CM

## 2019-01-18 PROCEDURE — 99213 OFFICE O/P EST LOW 20 MIN: CPT | Performed by: UROLOGY

## 2019-01-18 NOTE — PROGRESS NOTES
Sapphire Elizalde is a 52year old male. HPI:   Patient presents with: Follow - Up: Patient here for follow up. Patient on CIC 9x per day. 52year old male last seen in the office for ED 4/23/2018. He has a new diagnosis of CML.     Was admitted in mouth nightly as needed for Itching.  Disp: 60 tablet Rfl: 0   ATORVASTATIN 20 MG Oral Tab TAKE 1 TABLET(20 MG) BY MOUTH EVERY NIGHT Disp: 90 tablet Rfl: 0   ISOSORBIDE MONONITRATE ER 30 MG Oral Tablet 24 Hr TAKE 1 TABLET BY MOUTH EVERY EVENING Disp: 90 tab

## 2019-01-24 ENCOUNTER — IMAGING SERVICES (OUTPATIENT)
Dept: OTHER | Age: 54
End: 2019-01-24

## 2019-02-05 ENCOUNTER — TELEPHONE (OUTPATIENT)
Dept: SURGERY | Facility: CLINIC | Age: 54
End: 2019-02-05

## 2019-02-05 NOTE — TELEPHONE ENCOUNTER
Spoke with pt and he stated that he called [facility] to request a refill on his CIC caths and they told him that he needed to have our office call them. I told him that I will do that and get back to him.

## 2019-02-05 NOTE — TELEPHONE ENCOUNTER
Pt states he is running out of catheters - asking for RN to call to order more - pls call [facility] at [phone]

## 2019-02-05 NOTE — TELEPHONE ENCOUNTER
I called [facility] at [phone] and was placed on hold for 14 min and then spoke with [facility] and he explained that pt's acct has a hold status that states \"orders not allowed\" he informed that I would need to speak to the billing dept regarding

## 2019-02-08 ENCOUNTER — TELEPHONE (OUTPATIENT)
Dept: SURGERY | Facility: CLINIC | Age: 54
End: 2019-02-08

## 2019-02-11 DIAGNOSIS — M25.552 CHRONIC LEFT HIP PAIN: ICD-10-CM

## 2019-02-11 DIAGNOSIS — G89.29 CHRONIC LEFT HIP PAIN: ICD-10-CM

## 2019-02-11 RX ORDER — RANITIDINE 150 MG/1
TABLET ORAL
Qty: 60 TABLET | Refills: 0 | OUTPATIENT
Start: 2019-02-11

## 2019-02-11 RX ORDER — GABAPENTIN 600 MG/1
TABLET ORAL
Qty: 90 TABLET | Refills: 0 | Status: SHIPPED | OUTPATIENT
Start: 2019-02-11 | End: 2019-03-12

## 2019-02-12 NOTE — TELEPHONE ENCOUNTER
Called [facility] yesterday t about 4pm  was on hold for 39 minutes and was told that I  Needed to speak to somebody in billing due to account being in hold status. Was time to leave for day so I had terminate call.

## 2019-02-12 NOTE — TELEPHONE ENCOUNTER
Attempted to call [address] [place] [address]  Was on hold for 22 minutes, had to disconnect call. Will attempt to call back this afternoon.

## 2019-02-12 NOTE — TELEPHONE ENCOUNTER
Called Medicare analyst that sent original fax for request. No answer. LM to call back.      Louisa Rincon  [facility] analyst  [facility]: [phone]   EXT: [id number]

## 2019-02-12 NOTE — TELEPHONE ENCOUNTER
Was on hold for another 30 minutes, had to disconnect call. Called number on form from original fax order, no answer, left message to call back. Will attempt to try to call again.

## 2019-02-19 NOTE — TELEPHONE ENCOUNTER
Received call from Nora Abad [phone] from [employer]. States they received notes, but needs notes stating patient had tried closed system and two positive urine culture to qualify.  Informed Nora Abad [id number] that we do not have positive urine cultures and notes reflect p

## 2019-02-20 NOTE — TELEPHONE ENCOUNTER
dwight from [employer] calling to verify pt is a current pt of dr Rodney Voss. Pt's account number [de-identified].     Please call

## 2019-02-21 NOTE — TELEPHONE ENCOUNTER
Spoke with Bhavik Sotelo from [facility]. Confirmed that patient is still a patient of Aminah Webb. States she will be sending over detailed written order to be signed. States fax to be sent with the next 24 hours.

## 2019-02-21 NOTE — TELEPHONE ENCOUNTER
Rep from [facility] to find out if pt. Is still a pt of Dr Vadim Velasquez regarding pt. Getting urological supplies. Pts Reference # [id number].

## 2019-03-04 NOTE — TELEPHONE ENCOUNTER
Order signed by MD. Orders faxed to [facility] [address]. Confirmation received, copy sent to scanning.

## 2019-03-06 NOTE — TELEPHONE ENCOUNTER
Reinaldo from [facility] supplies calling in regards to ordered received. States the pts chart notes do not match the type of catheter that was listed in the order. States he needs clarification to place order for sterile catheter.       fax # [phone]

## 2019-03-07 NOTE — TELEPHONE ENCOUNTER
Spoke with Reinaldo from [employer]. States they needed clarification for types of catheters patient is using. Informed Reinaldo that order would be for the same catheter he was originally taking.  Advised him that we had previously received

## 2019-03-12 DIAGNOSIS — M25.552 CHRONIC LEFT HIP PAIN: ICD-10-CM

## 2019-03-12 DIAGNOSIS — G89.29 CHRONIC LEFT HIP PAIN: ICD-10-CM

## 2019-03-12 RX ORDER — GABAPENTIN 600 MG/1
TABLET ORAL
Qty: 90 TABLET | Refills: 0 | Status: SHIPPED | OUTPATIENT
Start: 2019-03-12 | End: 2019-04-11

## 2019-04-11 DIAGNOSIS — M25.552 CHRONIC LEFT HIP PAIN: ICD-10-CM

## 2019-04-11 DIAGNOSIS — G89.29 CHRONIC LEFT HIP PAIN: ICD-10-CM

## 2019-04-11 RX ORDER — GABAPENTIN 600 MG/1
TABLET ORAL
Qty: 90 TABLET | Refills: 0 | Status: SHIPPED | OUTPATIENT
Start: 2019-04-11 | End: 2019-05-11

## 2019-05-11 DIAGNOSIS — M25.552 CHRONIC LEFT HIP PAIN: ICD-10-CM

## 2019-05-11 DIAGNOSIS — G89.29 CHRONIC LEFT HIP PAIN: ICD-10-CM

## 2019-05-13 RX ORDER — GABAPENTIN 600 MG/1
TABLET ORAL
Qty: 90 TABLET | Refills: 0 | Status: SHIPPED | OUTPATIENT
Start: 2019-05-13 | End: 2019-06-14

## 2019-05-22 DIAGNOSIS — B35.1 ONYCHOMYCOSIS: ICD-10-CM

## 2019-05-22 RX ORDER — KETOCONAZOLE 20 MG/G
CREAM TOPICAL
Qty: 60 G | Refills: 0 | OUTPATIENT
Start: 2019-05-22

## 2019-05-26 LAB
BUN: 19 MG/DL (ref 8.4–25.7)
CREATININE: 0.86 MG/DL (ref 0.72–1.25)
EGFR AFRICAN-AMERICAN: 112.4

## 2019-06-07 LAB
ACTIVATED PTT: 25 SECONDS (ref 22.4–35.8)
ANION GAP: 10 (ref 8–16)
BUN/CREAT RATIO: 24 (ref 10–25)
CALCIUM: 9.2 MG/DL (ref 8.4–10.2)
CHLORIDE: 108 MMOL/L (ref 98–107)
CO2 TOTAL: 24 MMOL/L (ref 22–31)
CREAT SERPL-MCNC: 0.83 MG/DL (ref 0.72–1.25)
EGFR AFRICAN AMERICAN: 114
GLUCOSE, BLOOD: 150 MG/DL (ref 70–100)
HEMATOCRIT: 40.3 % (ref 42–52)
HEMOGLOBIN: 13.2 G/DL (ref 14–18)
MEAN CORPUSCULAR HBG CONC: 32.7 G/DL (ref 32–36)
MEAN CORPUSCULAR HEMOGLOBIN: 27.3 PG (ref 27–33)
MEAN CORPUSCULAR VOLUME: 83.6 FL (ref 80–94)
MEAN PLATELET VOLUME: 9.5 FL (ref 7.4–10.4)
PLATELET COUNT: 185 X10S9/L (ref 130–400)
POTASSIUM: 3.9 MMOL/L (ref 3.4–5.1)
PROTHROMBIN TIME-PATIENT: 9.9 SECONDS (ref 9.3–11.7)
PT-INR: 1 (ref 1–4)
RED BLOOD COUNT: 4.82 X10S12/L (ref 4.7–6.1)
RED CELL DISTRIBUTION WIDTH: 16.4 % (ref 11.5–14.5)
SODIUM: 142 MMOL/L (ref 136–145)
UREA NITROGEN (BUN): 20 MG/DL (ref 8.4–25.7)
WHITE BLOOD COUNT: 6.4 X10S9/L (ref 4.8–10.8)

## 2019-06-14 DIAGNOSIS — G89.29 CHRONIC LEFT HIP PAIN: ICD-10-CM

## 2019-06-14 DIAGNOSIS — M25.552 CHRONIC LEFT HIP PAIN: ICD-10-CM

## 2019-06-14 RX ORDER — GABAPENTIN 600 MG/1
TABLET ORAL
Qty: 90 TABLET | Refills: 0 | Status: SHIPPED | OUTPATIENT
Start: 2019-06-14 | End: 2019-07-15

## 2019-06-14 NOTE — TELEPHONE ENCOUNTER
Medication: GABAPENTIN 600 MG Oral Tab    Date of last refill: 5/13/19  [place]    Last office visit: 3/12/18   Due back to clinic per last office note:      Return in about 1 month (around 4/12/2018).         Date ne    Thank you very much for referring the patient to us.        PADDY Peterson  Pain Management

## 2019-07-12 DIAGNOSIS — B35.1 ONYCHOMYCOSIS: ICD-10-CM

## 2019-07-12 RX ORDER — KETOCONAZOLE 20 MG/G
CREAM TOPICAL
Qty: 60 G | Refills: 0 | OUTPATIENT
Start: 2019-07-12

## 2019-07-15 DIAGNOSIS — G89.29 CHRONIC LEFT HIP PAIN: ICD-10-CM

## 2019-07-15 DIAGNOSIS — M25.552 CHRONIC LEFT HIP PAIN: ICD-10-CM

## 2019-07-15 RX ORDER — GABAPENTIN 600 MG/1
TABLET ORAL
Qty: 90 TABLET | Refills: 0 | Status: SHIPPED | OUTPATIENT
Start: 2019-07-15 | End: 2019-08-13

## 2019-07-15 NOTE — TELEPHONE ENCOUNTER
Medication: GABAPENTIN 600 MG Oral Tab    Date of last refill: 6/14/19  [place]    Last office visit: 5/3/19  Due back to clinic per last office note:  He can follow-up with Dr. Reanna Nayak or Amanda on an as-needed basis.

## 2019-08-13 DIAGNOSIS — M25.552 CHRONIC LEFT HIP PAIN: ICD-10-CM

## 2019-08-13 DIAGNOSIS — G89.29 CHRONIC LEFT HIP PAIN: ICD-10-CM

## 2019-08-14 RX ORDER — GABAPENTIN 600 MG/1
TABLET ORAL
Qty: 90 TABLET | Refills: 2 | Status: SHIPPED | OUTPATIENT
Start: 2019-08-14 | End: 2019-11-09

## 2019-08-15 DIAGNOSIS — I10 ESSENTIAL HYPERTENSION: ICD-10-CM

## 2019-08-15 DIAGNOSIS — I71.03 DISSECTION OF THORACOABDOMINAL AORTA (HCC): ICD-10-CM

## 2019-08-15 RX ORDER — NIFEDIPINE 90 MG/1
TABLET, FILM COATED, EXTENDED RELEASE ORAL
Qty: 90 TABLET | Refills: 0 | OUTPATIENT
Start: 2019-08-15

## 2019-11-09 DIAGNOSIS — G89.29 CHRONIC LEFT HIP PAIN: ICD-10-CM

## 2019-11-09 DIAGNOSIS — M25.552 CHRONIC LEFT HIP PAIN: ICD-10-CM

## 2019-11-11 RX ORDER — GABAPENTIN 600 MG/1
TABLET ORAL
Qty: 90 TABLET | Refills: 0 | Status: SHIPPED | OUTPATIENT
Start: 2019-11-11 | End: 2019-12-10

## 2019-12-10 DIAGNOSIS — M25.552 CHRONIC LEFT HIP PAIN: ICD-10-CM

## 2019-12-10 DIAGNOSIS — G89.29 CHRONIC LEFT HIP PAIN: ICD-10-CM

## 2019-12-10 RX ORDER — GABAPENTIN 600 MG/1
TABLET ORAL
Qty: 90 TABLET | Refills: 0 | Status: SHIPPED | OUTPATIENT
Start: 2019-12-10 | End: 2020-01-20

## 2019-12-10 NOTE — TELEPHONE ENCOUNTER
Medication: GABAPENTIN 600 MG Oral Tab    Date of last refill: 11/11/19  [place]    Last office visit: 5/3/18  Due back to clinic per last office note:  follow-up with Dr. Katina Byrnes or Amanda on an as-needed basis.     Date n

## 2020-01-18 DIAGNOSIS — G89.29 CHRONIC LEFT HIP PAIN: ICD-10-CM

## 2020-01-18 DIAGNOSIS — M25.552 CHRONIC LEFT HIP PAIN: ICD-10-CM

## 2020-01-20 RX ORDER — GABAPENTIN 600 MG/1
TABLET ORAL
Qty: 90 TABLET | Refills: 0 | Status: SHIPPED | OUTPATIENT
Start: 2020-01-20

## 2020-01-20 NOTE — TELEPHONE ENCOUNTER
I have approved the refill today. Patient will need to ask his PCP to take over prescribing the medication for him since he is on a steady dose of gabapentin and not getting any injections or need to be seen in the office prior to any further refill.  Last office visit 5/2018

## 2020-02-14 DIAGNOSIS — G89.29 CHRONIC LEFT HIP PAIN: ICD-10-CM

## 2020-02-14 DIAGNOSIS — M25.552 CHRONIC LEFT HIP PAIN: ICD-10-CM

## 2020-02-14 NOTE — TELEPHONE ENCOUNTER
Medication: GABAPENTIN 600 MG Oral Tab    Date of last refill: 01/20/20  [place]    Last office visit: 05/03/18  Due back to clinic per last office note:  He can follow-up with Dr. Laura Kumar or Amanda on an as-needed basis.

## 2020-02-17 RX ORDER — GABAPENTIN 600 MG/1
TABLET ORAL
Qty: 90 TABLET | Refills: 0 | OUTPATIENT
Start: 2020-02-17

## 2020-03-24 DIAGNOSIS — M25.552 CHRONIC LEFT HIP PAIN: ICD-10-CM

## 2020-03-24 DIAGNOSIS — G89.29 CHRONIC LEFT HIP PAIN: ICD-10-CM

## 2020-03-24 RX ORDER — GABAPENTIN 600 MG/1
TABLET ORAL
Qty: 90 TABLET | Refills: 0 | OUTPATIENT
Start: 2020-03-24

## 2020-03-24 NOTE — TELEPHONE ENCOUNTER
Medication: GABAPENTIN 600 MG Oral Tab    Date of last refill: 1/20/20  [place]    Last office visit: 5/3/18  Due back to clinic per last office note:  He can follow-up with Dr. Yeni Tsang or Amanda on an as-needed basis.

## 2020-10-26 NOTE — TELEPHONE ENCOUNTER
Pt requesting refill of multiple medications, protocol failed, unable to approve:     Last Time Medication was Filled:  10/17    Last Office Visit with PCP: 11/28/17    Future Appointments  Date Time Provider Miya Tirado   1/25/2018 1:30 PM [facility] 30 NU No
